# Patient Record
Sex: MALE | Race: WHITE | NOT HISPANIC OR LATINO
[De-identification: names, ages, dates, MRNs, and addresses within clinical notes are randomized per-mention and may not be internally consistent; named-entity substitution may affect disease eponyms.]

---

## 2017-01-31 ENCOUNTER — FORM ENCOUNTER (OUTPATIENT)
Age: 82
End: 2017-01-31

## 2017-02-01 ENCOUNTER — OUTPATIENT (OUTPATIENT)
Dept: OUTPATIENT SERVICES | Facility: HOSPITAL | Age: 82
LOS: 1 days | End: 2017-02-01
Payer: MEDICARE

## 2017-02-01 ENCOUNTER — APPOINTMENT (OUTPATIENT)
Dept: ORTHOPEDIC SURGERY | Facility: CLINIC | Age: 82
End: 2017-02-01

## 2017-02-01 VITALS
DIASTOLIC BLOOD PRESSURE: 69 MMHG | HEIGHT: 68.5 IN | BODY MASS INDEX: 24.57 KG/M2 | WEIGHT: 164 LBS | SYSTOLIC BLOOD PRESSURE: 129 MMHG

## 2017-02-01 DIAGNOSIS — Z98.89 OTHER SPECIFIED POSTPROCEDURAL STATES: Chronic | ICD-10-CM

## 2017-02-01 PROCEDURE — 73521 X-RAY EXAM HIPS BI 2 VIEWS: CPT

## 2017-02-01 PROCEDURE — 73523 X-RAY EXAM HIPS BI 5/> VIEWS: CPT | Mod: 26

## 2017-02-23 ENCOUNTER — FORM ENCOUNTER (OUTPATIENT)
Age: 82
End: 2017-02-23

## 2017-02-24 ENCOUNTER — OUTPATIENT (OUTPATIENT)
Dept: OUTPATIENT SERVICES | Facility: HOSPITAL | Age: 82
LOS: 1 days | End: 2017-02-24
Payer: MEDICARE

## 2017-02-24 ENCOUNTER — APPOINTMENT (OUTPATIENT)
Dept: ORTHOPEDIC SURGERY | Facility: CLINIC | Age: 82
End: 2017-02-24

## 2017-02-24 DIAGNOSIS — Z98.89 OTHER SPECIFIED POSTPROCEDURAL STATES: Chronic | ICD-10-CM

## 2017-02-24 PROCEDURE — 71020: CPT | Mod: 26

## 2017-02-24 PROCEDURE — 71046 X-RAY EXAM CHEST 2 VIEWS: CPT

## 2017-02-24 RX ORDER — CELECOXIB 100 MG/1
100 CAPSULE ORAL DAILY
Qty: 30 | Refills: 1 | Status: DISCONTINUED | COMMUNITY
Start: 2017-02-01 | End: 2017-02-24

## 2017-03-15 ENCOUNTER — OUTPATIENT (OUTPATIENT)
Dept: OUTPATIENT SERVICES | Facility: HOSPITAL | Age: 82
LOS: 1 days | End: 2017-03-15

## 2017-03-15 ENCOUNTER — OUTPATIENT (OUTPATIENT)
Dept: OUTPATIENT SERVICES | Facility: HOSPITAL | Age: 82
LOS: 1 days | End: 2017-03-15
Payer: MEDICARE

## 2017-03-15 DIAGNOSIS — Z98.89 OTHER SPECIFIED POSTPROCEDURAL STATES: Chronic | ICD-10-CM

## 2017-03-15 DIAGNOSIS — M16.11 UNILATERAL PRIMARY OSTEOARTHRITIS, RIGHT HIP: ICD-10-CM

## 2017-03-15 DIAGNOSIS — Z22.321 CARRIER OR SUSPECTED CARRIER OF METHICILLIN SUSCEPTIBLE STAPHYLOCOCCUS AUREUS: ICD-10-CM

## 2017-03-15 LAB
ANION GAP SERPL CALC-SCNC: 8 MMOL/L — LOW (ref 9–16)
APPEARANCE UR: CLEAR — SIGNIFICANT CHANGE UP
APTT BLD: 41.5 SEC — HIGH (ref 27.5–37.4)
BACTERIA # UR AUTO: PRESENT /HPF
BILIRUB UR-MCNC: NEGATIVE — SIGNIFICANT CHANGE UP
BUN SERPL-MCNC: 30 MG/DL — HIGH (ref 7–23)
CALCIUM SERPL-MCNC: 9.7 MG/DL — SIGNIFICANT CHANGE UP (ref 8.5–10.5)
CHLORIDE SERPL-SCNC: 106 MMOL/L — SIGNIFICANT CHANGE UP (ref 96–108)
CO2 SERPL-SCNC: 29 MMOL/L — SIGNIFICANT CHANGE UP (ref 22–31)
COLOR SPEC: YELLOW — SIGNIFICANT CHANGE UP
CREAT SERPL-MCNC: 1.04 MG/DL — SIGNIFICANT CHANGE UP (ref 0.5–1.3)
DIFF PNL FLD: (no result)
EPI CELLS # UR: SIGNIFICANT CHANGE UP /HPF
GLUCOSE SERPL-MCNC: 86 MG/DL — SIGNIFICANT CHANGE UP (ref 70–99)
GLUCOSE UR QL: NEGATIVE — SIGNIFICANT CHANGE UP
HBA1C BLD-MCNC: 3.9 % — LOW (ref 4.8–5.6)
HCT VFR BLD CALC: 43.6 % — SIGNIFICANT CHANGE UP (ref 39–50)
HGB BLD-MCNC: 16.3 G/DL — SIGNIFICANT CHANGE UP (ref 13–17)
INR BLD: 1.27 — HIGH (ref 0.88–1.16)
KETONES UR-MCNC: NEGATIVE — SIGNIFICANT CHANGE UP
LEUKOCYTE ESTERASE UR-ACNC: NEGATIVE — SIGNIFICANT CHANGE UP
MCHC RBC-ENTMCNC: 34.2 PG — HIGH (ref 27–34)
MCHC RBC-ENTMCNC: 37.4 G/DL — HIGH (ref 32–36)
MCV RBC AUTO: 91.4 FL — SIGNIFICANT CHANGE UP (ref 80–100)
NITRITE UR-MCNC: NEGATIVE — SIGNIFICANT CHANGE UP
PH UR: 5.5 — SIGNIFICANT CHANGE UP (ref 4–8)
PLATELET # BLD AUTO: 249 K/UL — SIGNIFICANT CHANGE UP (ref 150–400)
POTASSIUM SERPL-MCNC: 4.8 MMOL/L — SIGNIFICANT CHANGE UP (ref 3.5–5.3)
POTASSIUM SERPL-SCNC: 4.8 MMOL/L — SIGNIFICANT CHANGE UP (ref 3.5–5.3)
PROT UR-MCNC: NEGATIVE MG/DL — SIGNIFICANT CHANGE UP
PROTHROM AB SERPL-ACNC: 14.1 SEC — HIGH (ref 10–13.1)
RBC # BLD: 4.77 M/UL — SIGNIFICANT CHANGE UP (ref 4.2–5.8)
RBC # FLD: 13.1 % — SIGNIFICANT CHANGE UP (ref 10.3–16.9)
RBC CASTS # UR COMP ASSIST: < 5 /HPF — SIGNIFICANT CHANGE UP
SODIUM SERPL-SCNC: 143 MMOL/L — SIGNIFICANT CHANGE UP (ref 135–145)
SP GR SPEC: 1.02 — SIGNIFICANT CHANGE UP (ref 1–1.03)
UROBILINOGEN FLD QL: 0.2 E.U./DL — SIGNIFICANT CHANGE UP
WBC # BLD: 10.5 K/UL — SIGNIFICANT CHANGE UP (ref 3.8–10.5)
WBC # FLD AUTO: 10.5 K/UL — SIGNIFICANT CHANGE UP (ref 3.8–10.5)
WBC UR QL: < 5 /HPF — SIGNIFICANT CHANGE UP

## 2017-03-15 PROCEDURE — 85730 THROMBOPLASTIN TIME PARTIAL: CPT

## 2017-03-15 PROCEDURE — 81001 URINALYSIS AUTO W/SCOPE: CPT

## 2017-03-15 PROCEDURE — 85610 PROTHROMBIN TIME: CPT

## 2017-03-15 PROCEDURE — 83036 HEMOGLOBIN GLYCOSYLATED A1C: CPT

## 2017-03-15 PROCEDURE — 87086 URINE CULTURE/COLONY COUNT: CPT

## 2017-03-15 PROCEDURE — 80048 BASIC METABOLIC PNL TOTAL CA: CPT

## 2017-03-15 PROCEDURE — 93010 ELECTROCARDIOGRAM REPORT: CPT

## 2017-03-15 PROCEDURE — 81003 URINALYSIS AUTO W/O SCOPE: CPT

## 2017-03-15 PROCEDURE — 93005 ELECTROCARDIOGRAM TRACING: CPT

## 2017-03-15 PROCEDURE — 85027 COMPLETE CBC AUTOMATED: CPT

## 2017-03-16 LAB
CULTURE RESULTS: NO GROWTH — SIGNIFICANT CHANGE UP
SPECIMEN SOURCE: SIGNIFICANT CHANGE UP

## 2017-03-17 LAB
MRSA PCR RESULT.: NEGATIVE — SIGNIFICANT CHANGE UP
S AUREUS DNA NOSE QL NAA+PROBE: POSITIVE

## 2017-03-27 ENCOUNTER — APPOINTMENT (OUTPATIENT)
Dept: INTERNAL MEDICINE | Facility: CLINIC | Age: 82
End: 2017-03-27

## 2017-03-27 VITALS
DIASTOLIC BLOOD PRESSURE: 60 MMHG | SYSTOLIC BLOOD PRESSURE: 102 MMHG | RESPIRATION RATE: 16 BRPM | HEART RATE: 78 BPM | TEMPERATURE: 98 F | OXYGEN SATURATION: 97 %

## 2017-03-27 DIAGNOSIS — Z87.39 PERSONAL HISTORY OF OTHER DISEASES OF THE MUSCULOSKELETAL SYSTEM AND CONNECTIVE TISSUE: ICD-10-CM

## 2017-03-30 ENCOUNTER — MEDICATION RENEWAL (OUTPATIENT)
Age: 82
End: 2017-03-30

## 2017-03-30 DIAGNOSIS — M25.551 PAIN IN RIGHT HIP: ICD-10-CM

## 2017-03-30 DIAGNOSIS — G89.29 PAIN IN RIGHT HIP: ICD-10-CM

## 2017-04-03 ENCOUNTER — RESULT REVIEW (OUTPATIENT)
Age: 82
End: 2017-04-03

## 2017-04-03 VITALS
TEMPERATURE: 99 F | HEART RATE: 68 BPM | WEIGHT: 163.36 LBS | DIASTOLIC BLOOD PRESSURE: 62 MMHG | RESPIRATION RATE: 17 BRPM | OXYGEN SATURATION: 96 % | SYSTOLIC BLOOD PRESSURE: 120 MMHG | HEIGHT: 68 IN

## 2017-04-03 NOTE — PATIENT PROFILE ADULT. - PMH
Arthritis    Atrial fibrillation    Blood in urine  Pt went to urologist last week and MD said pt had blood in urine; not visible to pt  BPH (benign prostatic hypertrophy)    Constipation    Spinal stenosis  herniated disc

## 2017-04-03 NOTE — PATIENT PROFILE ADULT. - PSH
H/O hernia repair  X 3 - last procedure was 10 years ago H/O hernia repair  X 3 - last procedure was 10 years ago  History of hip replacement, total, left    History of tonsillectomy

## 2017-04-04 ENCOUNTER — INPATIENT (INPATIENT)
Facility: HOSPITAL | Age: 82
LOS: 2 days | Discharge: HOME HEALTH | DRG: 470 | End: 2017-04-07
Attending: ORTHOPAEDIC SURGERY | Admitting: ORTHOPAEDIC SURGERY
Payer: MEDICARE

## 2017-04-04 ENCOUNTER — APPOINTMENT (OUTPATIENT)
Dept: ORTHOPEDIC SURGERY | Facility: HOSPITAL | Age: 82
End: 2017-04-04

## 2017-04-04 DIAGNOSIS — Z90.89 ACQUIRED ABSENCE OF OTHER ORGANS: Chronic | ICD-10-CM

## 2017-04-04 DIAGNOSIS — Z96.642 PRESENCE OF LEFT ARTIFICIAL HIP JOINT: Chronic | ICD-10-CM

## 2017-04-04 DIAGNOSIS — Z98.89 OTHER SPECIFIED POSTPROCEDURAL STATES: Chronic | ICD-10-CM

## 2017-04-04 DIAGNOSIS — M16.11 UNILATERAL PRIMARY OSTEOARTHRITIS, RIGHT HIP: ICD-10-CM

## 2017-04-04 LAB
APTT BLD: 34.8 SEC — SIGNIFICANT CHANGE UP (ref 27.5–37.4)
INR BLD: 0.98 — SIGNIFICANT CHANGE UP (ref 0.88–1.16)
PROTHROM AB SERPL-ACNC: 10.9 SEC — SIGNIFICANT CHANGE UP (ref 9.8–12.7)

## 2017-04-04 PROCEDURE — 72170 X-RAY EXAM OF PELVIS: CPT | Mod: 26

## 2017-04-04 PROCEDURE — 27130 TOTAL HIP ARTHROPLASTY: CPT | Mod: RT

## 2017-04-04 RX ORDER — CELECOXIB 200 MG/1
200 CAPSULE ORAL ONCE
Qty: 0 | Refills: 0 | Status: COMPLETED | OUTPATIENT
Start: 2017-04-04 | End: 2017-04-04

## 2017-04-04 RX ORDER — SODIUM CHLORIDE 9 MG/ML
1000 INJECTION, SOLUTION INTRAVENOUS
Qty: 0 | Refills: 0 | Status: DISCONTINUED | OUTPATIENT
Start: 2017-04-04 | End: 2017-04-07

## 2017-04-04 RX ORDER — PANTOPRAZOLE SODIUM 20 MG/1
40 TABLET, DELAYED RELEASE ORAL
Qty: 0 | Refills: 0 | Status: DISCONTINUED | OUTPATIENT
Start: 2017-04-04 | End: 2017-04-07

## 2017-04-04 RX ORDER — OXYCODONE HYDROCHLORIDE 5 MG/1
10 TABLET ORAL EVERY 4 HOURS
Qty: 0 | Refills: 0 | Status: DISCONTINUED | OUTPATIENT
Start: 2017-04-04 | End: 2017-04-07

## 2017-04-04 RX ORDER — MORPHINE SULFATE 50 MG/1
2 CAPSULE, EXTENDED RELEASE ORAL
Qty: 0 | Refills: 0 | Status: DISCONTINUED | OUTPATIENT
Start: 2017-04-04 | End: 2017-04-07

## 2017-04-04 RX ORDER — ONDANSETRON 8 MG/1
4 TABLET, FILM COATED ORAL EVERY 6 HOURS
Qty: 0 | Refills: 0 | Status: DISCONTINUED | OUTPATIENT
Start: 2017-04-04 | End: 2017-04-07

## 2017-04-04 RX ORDER — MAGNESIUM HYDROXIDE 400 MG/1
30 TABLET, CHEWABLE ORAL DAILY
Qty: 0 | Refills: 0 | Status: DISCONTINUED | OUTPATIENT
Start: 2017-04-04 | End: 2017-04-07

## 2017-04-04 RX ORDER — TAMSULOSIN HYDROCHLORIDE 0.4 MG/1
0.4 CAPSULE ORAL AT BEDTIME
Qty: 0 | Refills: 0 | Status: DISCONTINUED | OUTPATIENT
Start: 2017-04-04 | End: 2017-04-07

## 2017-04-04 RX ORDER — OXYCODONE HYDROCHLORIDE 5 MG/1
10 TABLET ORAL ONCE
Qty: 0 | Refills: 0 | Status: DISCONTINUED | OUTPATIENT
Start: 2017-04-04 | End: 2017-04-04

## 2017-04-04 RX ORDER — SENNA PLUS 8.6 MG/1
2 TABLET ORAL AT BEDTIME
Qty: 0 | Refills: 0 | Status: DISCONTINUED | OUTPATIENT
Start: 2017-04-04 | End: 2017-04-07

## 2017-04-04 RX ORDER — ENOXAPARIN SODIUM 100 MG/ML
30 INJECTION SUBCUTANEOUS EVERY 12 HOURS
Qty: 0 | Refills: 0 | Status: DISCONTINUED | OUTPATIENT
Start: 2017-04-04 | End: 2017-04-07

## 2017-04-04 RX ORDER — BUPIVACAINE 13.3 MG/ML
20 INJECTION, SUSPENSION, LIPOSOMAL INFILTRATION ONCE
Qty: 0 | Refills: 0 | Status: DISCONTINUED | OUTPATIENT
Start: 2017-04-04 | End: 2017-04-07

## 2017-04-04 RX ORDER — FINASTERIDE 5 MG/1
5 TABLET, FILM COATED ORAL DAILY
Qty: 0 | Refills: 0 | Status: DISCONTINUED | OUTPATIENT
Start: 2017-04-04 | End: 2017-04-07

## 2017-04-04 RX ORDER — METOPROLOL TARTRATE 50 MG
12.5 TABLET ORAL DAILY
Qty: 0 | Refills: 0 | Status: DISCONTINUED | OUTPATIENT
Start: 2017-04-04 | End: 2017-04-07

## 2017-04-04 RX ORDER — ACETAMINOPHEN 500 MG
650 TABLET ORAL EVERY 6 HOURS
Qty: 0 | Refills: 0 | Status: DISCONTINUED | OUTPATIENT
Start: 2017-04-04 | End: 2017-04-07

## 2017-04-04 RX ORDER — CELECOXIB 200 MG/1
100 CAPSULE ORAL
Qty: 0 | Refills: 0 | Status: DISCONTINUED | OUTPATIENT
Start: 2017-04-04 | End: 2017-04-07

## 2017-04-04 RX ORDER — POLYETHYLENE GLYCOL 3350 17 G/17G
17 POWDER, FOR SOLUTION ORAL DAILY
Qty: 0 | Refills: 0 | Status: DISCONTINUED | OUTPATIENT
Start: 2017-04-04 | End: 2017-04-07

## 2017-04-04 RX ORDER — RIVAROXABAN 15 MG-20MG
1 KIT ORAL
Qty: 0 | Refills: 0 | COMMUNITY

## 2017-04-04 RX ORDER — DOCUSATE SODIUM 100 MG
100 CAPSULE ORAL THREE TIMES A DAY
Qty: 0 | Refills: 0 | Status: DISCONTINUED | OUTPATIENT
Start: 2017-04-04 | End: 2017-04-07

## 2017-04-04 RX ORDER — CEFAZOLIN SODIUM 1 G
2000 VIAL (EA) INJECTION EVERY 8 HOURS
Qty: 0 | Refills: 0 | Status: COMPLETED | OUTPATIENT
Start: 2017-04-04 | End: 2017-04-05

## 2017-04-04 RX ORDER — OXYCODONE HYDROCHLORIDE 5 MG/1
5 TABLET ORAL EVERY 4 HOURS
Qty: 0 | Refills: 0 | Status: DISCONTINUED | OUTPATIENT
Start: 2017-04-04 | End: 2017-04-07

## 2017-04-04 RX ADMIN — OXYCODONE HYDROCHLORIDE 10 MILLIGRAM(S): 5 TABLET ORAL at 10:01

## 2017-04-04 RX ADMIN — Medication 12.5 MILLIGRAM(S): at 22:33

## 2017-04-04 RX ADMIN — Medication 100 MILLIGRAM(S): at 22:33

## 2017-04-04 RX ADMIN — SODIUM CHLORIDE 100 MILLILITER(S): 9 INJECTION, SOLUTION INTRAVENOUS at 22:33

## 2017-04-04 RX ADMIN — FINASTERIDE 5 MILLIGRAM(S): 5 TABLET, FILM COATED ORAL at 22:34

## 2017-04-04 RX ADMIN — TAMSULOSIN HYDROCHLORIDE 0.4 MILLIGRAM(S): 0.4 CAPSULE ORAL at 22:34

## 2017-04-04 RX ADMIN — SENNA PLUS 2 TABLET(S): 8.6 TABLET ORAL at 22:34

## 2017-04-04 RX ADMIN — CELECOXIB 200 MILLIGRAM(S): 200 CAPSULE ORAL at 10:01

## 2017-04-04 NOTE — H&P PST ADULT - LAB RESULTS AND INTERPRETATION
Preop CBC, BMP, UA - WNL per medical clearance.  PT/PTT/INR (mildly elevated on xarelto pre-op) Will repeat on DOS.  Preop Nasal swab positive for MSSA - treated x 5days with mupirocin BID.

## 2017-04-04 NOTE — H&P PST ADULT - NOTES
Pt daughter and grandson are willing to come live with pt to provide assistance post-op so he can go home. Pt does not want to go to Rehab.

## 2017-04-04 NOTE — H&P PST ADULT - PSH
H/O hernia repair  X 3 - last procedure was 10 years ago  History of hip replacement, total, left    History of tonsillectomy

## 2017-04-04 NOTE — PHYSICAL THERAPY INITIAL EVALUATION ADULT - IMPAIRMENTS CONTRIBUTING TO GAIT DEVIATIONS, PT EVAL
impaired motor control/impaired postural control/impaired sensory feedback/decreased strength/decreased ROM/impaired balance/pain

## 2017-04-04 NOTE — H&P PST ADULT - HISTORY OF PRESENT ILLNESS
82 yo M presents c/o right hip pain which became severe last summer causing pt to experience great difficulty when walking. Pt endorses that his hip "gives way" and his pain is even occasionally severe enough to wake him from sleep recently. Pt radiates to his ankle but is not associated with numbness or tingling. Pt pain began spontaneously and without accident or trauma. Pt pain has progressively worsened without improvement and has become increasingly unresponsive to conservative management. Pt ambulates without assistance at home usually though he admits to using a cane occasionally when pain is especially severe. Denies numbness/tingling of extremities, F/C/N/V, CP, SOB today.     Of note, pt takes xarelto for AFIB and his last dose was 3 days ago.    Presents today for elective Right Total Hip Arthroplasty.

## 2017-04-04 NOTE — PHYSICAL THERAPY INITIAL EVALUATION ADULT - BALANCE DISTURBANCE, IDENTIFIED IMPAIRMENT CONTRIBUTE, REHAB EVAL
decreased ROM/impaired motor control/impaired sensory feedback/pain/impaired postural control/decreased strength

## 2017-04-04 NOTE — PHYSICAL THERAPY INITIAL EVALUATION ADULT - PLANNED THERAPY INTERVENTIONS, PT EVAL
strengthening/neuromuscular re-education/balance training/bed mobility training/transfer training/gait training

## 2017-04-04 NOTE — PROGRESS NOTE ADULT - SUBJECTIVE AND OBJECTIVE BOX
Orthopaedics Post Op Check    Procedure: Right total hip replacement  Surgeon: Dr. Dejesus    Pt comfortable, without complaints  Denies CP, SOB, N/V, numbness/tingling     Vital Signs Last 24 Hrs  T(C): 36.1, Max: 36.1 (04-04 @ 15:00)  T(F): 97, Max: 97 (04-04 @ 15:00)  HR: 72 (72 - 77)  BP: 101/57 (87/58 - 101/57)  BP(mean): --  RR: 16 (16 - 16)  SpO2: 97% (97% - 98%)  AVSS, NAD    Dressing C/D/I  General: Pt Alert and oriented     Pulses: DP pulses 2+ bilaterally  Sensation: SLT in tact and equal to distal bilateral lower extremities  Motor: EHL/FHL/TA/GS 5/5 motor strength bilateral lower extremities    Post op XR: Right hip prosthesis in place    A/P: 81yMale POD#0 s/p right THR  - Stable  - Pain Control  - DVT ppx: Lovenox 30 BID  - Post op abx: Ancef  - PT, WBS: WBAT  - F/U AM Labs

## 2017-04-04 NOTE — H&P PST ADULT - PROBLEM SELECTOR PLAN 1
Admit to Orthopaedic Service.  Presents today for elective Right Total Hip Arthroplasty.  Pt medically stable and cleared for procedure today by Dr. Langston, Cardiac Clearance by Dr. Spencer.

## 2017-04-04 NOTE — H&P PST ADULT - FAMILY HISTORY
Father  Still living? Unknown  Family history of cardiac disorder in father, Age at diagnosis: Age Unknown     Sibling  Still living? Unknown  Family history of cardiac disorder in father, Age at diagnosis: Age Unknown

## 2017-04-04 NOTE — PHYSICAL THERAPY INITIAL EVALUATION ADULT - ADDITIONAL COMMENTS
No prior use of assistive device, however owns a cane. One large 12" step to approach outside steps of house. 5 steps to enter and 12 steps to get upstairs to bedroom.

## 2017-04-04 NOTE — PHYSICAL THERAPY INITIAL EVALUATION ADULT - IMPAIRED TRANSFERS: SIT/STAND, REHAB EVAL
impaired sensory feedback/impaired postural control/decreased ROM/impaired motor control/pain/impaired balance/decreased strength

## 2017-04-05 ENCOUNTER — TRANSCRIPTION ENCOUNTER (OUTPATIENT)
Age: 82
End: 2017-04-05

## 2017-04-05 LAB
ANION GAP SERPL CALC-SCNC: 9 MMOL/L — SIGNIFICANT CHANGE UP (ref 9–16)
BUN SERPL-MCNC: 30 MG/DL — HIGH (ref 7–23)
CALCIUM SERPL-MCNC: 8.7 MG/DL — SIGNIFICANT CHANGE UP (ref 8.5–10.5)
CHLORIDE SERPL-SCNC: 108 MMOL/L — SIGNIFICANT CHANGE UP (ref 96–108)
CO2 SERPL-SCNC: 24 MMOL/L — SIGNIFICANT CHANGE UP (ref 22–31)
CREAT SERPL-MCNC: 0.98 MG/DL — SIGNIFICANT CHANGE UP (ref 0.5–1.3)
GLUCOSE SERPL-MCNC: 107 MG/DL — HIGH (ref 70–99)
HCT VFR BLD CALC: 33.6 % — LOW (ref 39–50)
HGB BLD-MCNC: 12.7 G/DL — LOW (ref 13–17)
MCHC RBC-ENTMCNC: 34.2 PG — HIGH (ref 27–34)
MCHC RBC-ENTMCNC: 37.8 G/DL — HIGH (ref 32–36)
MCV RBC AUTO: 90.6 FL — SIGNIFICANT CHANGE UP (ref 80–100)
PLATELET # BLD AUTO: 189 K/UL — SIGNIFICANT CHANGE UP (ref 150–400)
POTASSIUM SERPL-MCNC: 4.4 MMOL/L — SIGNIFICANT CHANGE UP (ref 3.5–5.3)
POTASSIUM SERPL-SCNC: 4.4 MMOL/L — SIGNIFICANT CHANGE UP (ref 3.5–5.3)
RBC # BLD: 3.71 M/UL — LOW (ref 4.2–5.8)
RBC # FLD: 12.4 % — SIGNIFICANT CHANGE UP (ref 10.3–16.9)
SODIUM SERPL-SCNC: 141 MMOL/L — SIGNIFICANT CHANGE UP (ref 135–145)
WBC # BLD: 17.6 K/UL — HIGH (ref 3.8–10.5)
WBC # FLD AUTO: 17.6 K/UL — HIGH (ref 3.8–10.5)

## 2017-04-05 PROCEDURE — 99221 1ST HOSP IP/OBS SF/LOW 40: CPT

## 2017-04-05 RX ADMIN — CELECOXIB 100 MILLIGRAM(S): 200 CAPSULE ORAL at 17:41

## 2017-04-05 RX ADMIN — PANTOPRAZOLE SODIUM 40 MILLIGRAM(S): 20 TABLET, DELAYED RELEASE ORAL at 05:10

## 2017-04-05 RX ADMIN — FINASTERIDE 5 MILLIGRAM(S): 5 TABLET, FILM COATED ORAL at 12:32

## 2017-04-05 RX ADMIN — SODIUM CHLORIDE 100 MILLILITER(S): 9 INJECTION, SOLUTION INTRAVENOUS at 06:55

## 2017-04-05 RX ADMIN — OXYCODONE HYDROCHLORIDE 5 MILLIGRAM(S): 5 TABLET ORAL at 14:39

## 2017-04-05 RX ADMIN — POLYETHYLENE GLYCOL 3350 17 GRAM(S): 17 POWDER, FOR SOLUTION ORAL at 12:13

## 2017-04-05 RX ADMIN — Medication 100 MILLIGRAM(S): at 13:47

## 2017-04-05 RX ADMIN — Medication 100 MILLIGRAM(S): at 21:16

## 2017-04-05 RX ADMIN — SENNA PLUS 2 TABLET(S): 8.6 TABLET ORAL at 21:16

## 2017-04-05 RX ADMIN — CELECOXIB 100 MILLIGRAM(S): 200 CAPSULE ORAL at 09:39

## 2017-04-05 RX ADMIN — OXYCODONE HYDROCHLORIDE 5 MILLIGRAM(S): 5 TABLET ORAL at 23:30

## 2017-04-05 RX ADMIN — OXYCODONE HYDROCHLORIDE 5 MILLIGRAM(S): 5 TABLET ORAL at 13:46

## 2017-04-05 RX ADMIN — TAMSULOSIN HYDROCHLORIDE 0.4 MILLIGRAM(S): 0.4 CAPSULE ORAL at 21:16

## 2017-04-05 RX ADMIN — Medication 100 MILLIGRAM(S): at 05:10

## 2017-04-05 RX ADMIN — CELECOXIB 100 MILLIGRAM(S): 200 CAPSULE ORAL at 08:59

## 2017-04-05 RX ADMIN — CELECOXIB 100 MILLIGRAM(S): 200 CAPSULE ORAL at 18:56

## 2017-04-05 RX ADMIN — OXYCODONE HYDROCHLORIDE 5 MILLIGRAM(S): 5 TABLET ORAL at 22:45

## 2017-04-05 RX ADMIN — ENOXAPARIN SODIUM 30 MILLIGRAM(S): 100 INJECTION SUBCUTANEOUS at 17:40

## 2017-04-05 RX ADMIN — ENOXAPARIN SODIUM 30 MILLIGRAM(S): 100 INJECTION SUBCUTANEOUS at 05:10

## 2017-04-05 RX ADMIN — Medication 12.5 MILLIGRAM(S): at 22:37

## 2017-04-05 NOTE — PROGRESS NOTE ADULT - SUBJECTIVE AND OBJECTIVE BOX
ORTHO NOTE    [x ] Pt seen/examined. 8am  [ ] Pt without any complaints/in NAD.    [x ] Pt complains of: Incisional pain with movements     [ ] ROS  otherwise negative    .    PHYSICAL EXAM:    Vital Signs Last 24 Hrs  T(C): 36.8, Max: 37.2 (04-04 @ 17:00)  T(F): 98.2, Max: 99 (04-04 @ 17:00)  HR: 68 (62 - 79)  BP: 109/61 (87/58 - 142/69)  BP(mean): --  RR: 16 (15 - 18)  SpO2: 98% (96% - 98%)    I&O's Detail  I & Os for 24h ending 05 Apr 2017 07:00  =============================================  IN:    lactated ringers.: 1200 ml    IV PiggyBack: 100 ml    Total IN: 1300 ml  ---------------------------------------------  OUT:    Voided: 1560 ml    Total OUT: 1560 ml  ---------------------------------------------  Total NET: -260 ml    I & Os for current day (as of 05 Apr 2017 12:24)  =============================================  IN:    Total IN: 0 ml  ---------------------------------------------  OUT:    Voided: 130 ml    Total OUT: 130 ml  ---------------------------------------------  Total NET: -130 ml       CAPILLARY BLOOD GLUCOSE                  Neuro: A&0x3    Lungs: Denies SOB    CV: Denies chest pain    ABD: NON distended positive bowel sounds      Ext: NVID Dressing clean dry and intact.      LABS                        12.7   17.6  )-----------( 189      ( 05 Apr 2017 08:15 )             33.6                              PT/INR - ( 04 Apr 2017 09:25 )   PT: 10.9 sec;   INR: 0.98          PTT - ( 04 Apr 2017 09:25 )  PTT:34.8 sec  04-05    141  |  108  |  30<H>  ----------------------------<  107<H>  4.4   |  24  |  0.98    Ca    8.7      05 Apr 2017 08:15           ASSESSMENT/PLAN:      STATUS POST:  Right MARSHA    CONTINUE:          [x ] PT WBAT    [x ] DVT PPX-Lovenox hx Afib    [x ] Pain MgtMEDICATIONS  (PRN):  oxyCODONE IR 5milliGRAM(s) Oral every 4 hours PRN Mild Pain  oxyCODONE IR 10milliGRAM(s) Oral every 4 hours PRN Moderate Pain  morphine  - Injectable 2milliGRAM(s) IV Push every 3 hours PRN Severe Pain  morphine  - Injectable 2milliGRAM(s) IV Push every 15 minutes PRN Severe Pain (7 - 10)      [x ] Dispo plan- Home

## 2017-04-05 NOTE — DISCHARGE NOTE ADULT - CARE PLAN
Principal Discharge DX:	Arthritis  Goal:	Pain Management, improve mobility  Instructions for follow-up, activity and diet:	see below

## 2017-04-05 NOTE — DISCHARGE NOTE ADULT - HOSPITAL COURSE
Admit 4/4/17  Surgery S/P Right MARSHA (posterior)  Pre/post-op Antibiotics  DVT prophylaxis  Physical Therapy  Pain Management

## 2017-04-05 NOTE — DISCHARGE NOTE ADULT - CARE PROVIDER_API CALL
Charly Dejesus), Orthopaedic Surgery  [address] [place] [address]  Phone: [phone]  Fax: [phone]

## 2017-04-05 NOTE — DISCHARGE NOTE ADULT - MEDICATION SUMMARY - MEDICATIONS TO TAKE
I will START or STAY ON the medications listed below when I get home from the hospital:    finasteride 5 mg oral tablet  -- 1 tab(s) by mouth once a day  -- Indication: For Home meds    magnesium hydroxide 8% oral suspension  -- 30 milliliter(s) by mouth once a day, As needed, Constipation  -- Indication: For Prevent constipation    tamsulosin 0.4 mg oral capsule  -- 1 cap(s) by mouth once a day  -- Indication: For Home meds    Xarelto 20 mg oral tablet  -- 1 tab(s) by mouth once a day (in the evening)  -- Indication: For Home meds    metoprolol  -- 12.5 milligram(s) by mouth once a day  -- Indication: For Home meds    docusate sodium 100 mg oral capsule  -- 1 cap(s) by mouth 3 times a day  -- Indication: For stool softener    omeprazole 40 mg oral delayed release capsule  -- 1 cap(s) by mouth once a day  -- Indication: For Home meds

## 2017-04-05 NOTE — CONSULT NOTE ADULT - SUBJECTIVE AND OBJECTIVE BOX
Patient seen and examined, Chart reviewed    HPI:  80 yo M presents c/o right hip pain which became severe last summer causing pt to experience great difficulty when walking. Pt endorses that his hip "gives way" and his pain is even occasionally severe enough to wake him from sleep recently. Pt radiates to his ankle but is not associated with numbness or tingling. Pt pain began spontaneously and without accident or trauma. Pt pain has progressively worsened without improvement and has become increasingly unresponsive to conservative management. Pt ambulates without assistance at home usually though he admits to using a cane occasionally when pain is especially severe. Denies numbness/tingling of extremities, F/C/N/V, CP, SOB today.     Of note, pt takes xarelto for AFIB and his last dose was 3 days ago.    Presents today for elective Right Total Hip Arthroplasty. (04 Apr 2017 13:57)      PAST MEDICAL & SURGICAL HISTORY:  BPH (benign prostatic hypertrophy)  Spinal stenosis: herniated disc  Arthritis  Blood in urine: Pt went to urologist last week and MD said pt had blood in urine; not visible to pt  Constipation  Atrial fibrillation  History of tonsillectomy  History of hip replacement, total, left  H/O hernia repair: X 3 - last procedure was 10 years ago      REVIEW OF SYSTEMS:  CONSTITUTIONAL:  No night sweats.  Some fatigue, pos insomnia,  No fever or chills.  HEENT:  Eyes:  No visual changes.    RESPIRATORY:  No cough.  No wheeze.    No shortness of breath.  CARDIOVASCULAR:  No chest pains.  No palpitations.  GASTROINTESTINAL:  No abdominal pain.  No nausea or vomiting.  No diarrhea or constipation.    GENITOURINARY:  No urgency.  No frequency.  No dysuria.   MUSCULOSKELETAL:  No musculoskeletal pain.      SKIN:  No rashes.  No lesions.  No wounds.      lactated ringers. 1000milliLiter(s) IV Continuous <Continuous>  enoxaparin Injectable 30milliGRAM(s) SubCutaneous every 12 hours  acetaminophen   Tablet 650milliGRAM(s) Oral every 6 hours PRN  celecoxib 100milliGRAM(s) Oral two times a day after meals  oxyCODONE IR 5milliGRAM(s) Oral every 4 hours PRN  oxyCODONE IR 10milliGRAM(s) Oral every 4 hours PRN  BUpivacaine liposome 1.3% Injectable (no eMAR) 20milliLiter(s) Local Injection once  morphine  - Injectable 2milliGRAM(s) IV Push every 3 hours PRN  aluminum hydroxide/magnesium hydroxide/simethicone Suspension 30milliLiter(s) Oral four times a day PRN  ondansetron Injectable 4milliGRAM(s) IV Push every 6 hours PRN  polyethylene glycol 3350 17Gram(s) Oral daily  senna 2Tablet(s) Oral at bedtime  magnesium hydroxide Suspension 30milliLiter(s) Oral daily PRN  docusate sodium 100milliGRAM(s) Oral three times a day  morphine  - Injectable 2milliGRAM(s) IV Push every 15 minutes PRN  finasteride 5milliGRAM(s) Oral daily  tamsulosin 0.4milliGRAM(s) Oral at bedtime  metoprolol 12.5milliGRAM(s) Oral daily  pantoprazole    Tablet 40milliGRAM(s) Oral before breakfast      Allergies    No Known Allergies      SOCIAL HISTORY: no tob    FAMILY HISTORY:  Family history of cardiac disorder in father (Father, Sibling)      Vital Signs Last 24 Hrs  T(C): 36.8, Max: 37.2 (04-04 @ 17:00)  T(F): 98.2, Max: 99 (04-04 @ 17:00)  HR: 62 (62 - 79)  BP: 99/58 (87/58 - 142/69)  BP(mean): --  RR: 18 (15 - 18)  SpO2: 96% (96% - 98%)    I & Os for current day (as of 04-05 @ 06:30)  =============================================  IN: 1300 ml / OUT: 1330 ml / NET: -30 ml      PHYSICAL EXAM:   General - NAD, Alert and oriented x 3,   Neck - - No lymphadenopathy  Cardiovascular - RRR  2/6 sam /r/g, no JVD  Lungs - Clear to auscltation, no use of acessory muscles, no crackles or wheezes.  Skin - No rashes, skin warm and dry, no erythematous areas  Abdomen - Normal bowel sounds, abdomen soft and nontender  Extremeties - No edema, SCD's opn b/l le  Neurological – no focal deficits      LABS        PT/INR - ( 04 Apr 2017 09:25 )   PT: 10.9 sec;   INR: 0.98          PTT - ( 04 Apr 2017 09:25 )  PTT:34.8 sec      RADIOLOGY & ADDITIONAL STUDIES:

## 2017-04-05 NOTE — DISCHARGE NOTE ADULT - MEDICATION SUMMARY - MEDICATIONS TO STOP TAKING
I will STOP taking the medications listed below when I get home from the hospital:    mupirocin topical 2% topical ointment  -- Apply on skin to both nostrils 2 times a day for 5 days  -- For external use only.

## 2017-04-05 NOTE — DISCHARGE NOTE ADULT - PATIENT PORTAL LINK FT
“You can access the Follow[employer] Patient Portal, offered by [facility], by registering with the following website: [email or web address]”

## 2017-04-05 NOTE — DISCHARGE NOTE ADULT - ADDITIONAL INSTRUCTIONS
No strenuous activity, heavy lifting, driving, tub bathing, or returning to work until cleared by MD.  You may shower--dressing is waterproof.  Remove dressing after post op day 7, then leave incision open to air.  Follow up with Dr. Dejesus call  to schedule an appt within 10-14 days.  If you don't have a bowel movement by post op day 3, then take Milk of Magnesia (over the counter).  If no bowel movement by at least post op day 5, then use a Dulcolax suppository (over the counter) and/or a Fleets enema--if still no bowel movement, call your MD.  Contact your doctor if you experience: fever greater than 101.5, chills, chest pain, difficulty breathing, bleeding, redness or heat around the incision.     Please follow up with your primary care provider.  Please take  pain meds as prescribed by Dr Dejesus.

## 2017-04-05 NOTE — PROGRESS NOTE ADULT - SUBJECTIVE AND OBJECTIVE BOX
SUBJECTIVE: Patient seen and examined. Pt doing well o/n.  No f/c/n/v/cp/sob.     OBJECTIVE:  Vital Signs Last 24 Hrs  T(C): 36.8, Max: 37.2 (04-04 @ 17:00)  T(F): 98.2, Max: 99 (04-04 @ 17:00)  HR: 68 (62 - 79)  BP: 109/61 (87/58 - 142/69)  BP(mean): --  RR: 16 (15 - 18)  SpO2: 98% (96% - 98%)    Affected extremity:          Dressing: clean/dry/intact            Sensation: SILT         Motor exam: 5/5 TA/GS/EHL         warm well perfused; capillary refill <3 seconds     LABS:                        12.7   17.6  )-----------( 189      ( 05 Apr 2017 08:15 )             33.6     04-05    141  |  108  |  30<H>  ----------------------------<  107<H>  4.4   |  24  |  0.98    Ca    8.7      05 Apr 2017 08:15      PT/INR - ( 04 Apr 2017 09:25 )   PT: 10.9 sec;   INR: 0.98          PTT - ( 04 Apr 2017 09:25 )  PTT:34.8 sec    MEDICATIONS:lactated ringers. 1000milliLiter(s) IV Continuous <Continuous>  enoxaparin Injectable 30milliGRAM(s) SubCutaneous every 12 hours  acetaminophen   Tablet 650milliGRAM(s) Oral every 6 hours PRN  celecoxib 100milliGRAM(s) Oral two times a day after meals  oxyCODONE IR 5milliGRAM(s) Oral every 4 hours PRN  oxyCODONE IR 10milliGRAM(s) Oral every 4 hours PRN  BUpivacaine liposome 1.3% Injectable (no eMAR) 20milliLiter(s) Local Injection once  morphine  - Injectable 2milliGRAM(s) IV Push every 3 hours PRN  aluminum hydroxide/magnesium hydroxide/simethicone Suspension 30milliLiter(s) Oral four times a day PRN  ondansetron Injectable 4milliGRAM(s) IV Push every 6 hours PRN  polyethylene glycol 3350 17Gram(s) Oral daily  senna 2Tablet(s) Oral at bedtime  magnesium hydroxide Suspension 30milliLiter(s) Oral daily PRN  docusate sodium 100milliGRAM(s) Oral three times a day  morphine  - Injectable 2milliGRAM(s) IV Push every 15 minutes PRN  finasteride 5milliGRAM(s) Oral daily  tamsulosin 0.4milliGRAM(s) Oral at bedtime  metoprolol 12.5milliGRAM(s) Oral daily  pantoprazole    Tablet 40milliGRAM(s) Oral before breakfast    Anticoagulation:  enoxaparin Injectable 30milliGRAM(s) SubCutaneous every 12 hours    Antibiotics:     Pain medications:   acetaminophen   Tablet 650milliGRAM(s) Oral every 6 hours PRN  celecoxib 100milliGRAM(s) Oral two times a day after meals  oxyCODONE IR 5milliGRAM(s) Oral every 4 hours PRN  oxyCODONE IR 10milliGRAM(s) Oral every 4 hours PRN  morphine  - Injectable 2milliGRAM(s) IV Push every 3 hours PRN  ondansetron Injectable 4milliGRAM(s) IV Push every 6 hours PRN  morphine  - Injectable 2milliGRAM(s) IV Push every 15 minutes PRN    A/P :  Pt is a 82yo Male s/p R MARSHA 4/4/17 POD # 1  -    Pain control  -    DVT ppx: Lovenox     -    Weight bearing status: WBAT   -    Physical Therapy  -    Dispo: Home

## 2017-04-05 NOTE — CONSULT NOTE ADULT - ASSESSMENT
80 yo M with BPH, Spinal stenosis, atrial fibrillation on xarelto with severe right hip pain now s/p elective right THR  1: A Fib - restart xarelto, continue metoprolol  2: BPH: continue tamsulosin and finasteride  3: Pain mgment / bowel regimen  4: DVT PPX: on lovenox - dc once xarelto restarted, continue SCDs while in bed

## 2017-04-06 LAB
ANION GAP SERPL CALC-SCNC: 10 MMOL/L — SIGNIFICANT CHANGE UP (ref 9–16)
BUN SERPL-MCNC: 24 MG/DL — HIGH (ref 7–23)
CALCIUM SERPL-MCNC: 8.9 MG/DL — SIGNIFICANT CHANGE UP (ref 8.5–10.5)
CHLORIDE SERPL-SCNC: 106 MMOL/L — SIGNIFICANT CHANGE UP (ref 96–108)
CO2 SERPL-SCNC: 23 MMOL/L — SIGNIFICANT CHANGE UP (ref 22–31)
CREAT SERPL-MCNC: 1.12 MG/DL — SIGNIFICANT CHANGE UP (ref 0.5–1.3)
GLUCOSE SERPL-MCNC: 118 MG/DL — HIGH (ref 70–99)
HCT VFR BLD CALC: 35.7 % — LOW (ref 39–50)
HGB BLD-MCNC: 13.3 G/DL — SIGNIFICANT CHANGE UP (ref 13–17)
MCHC RBC-ENTMCNC: 33.9 PG — SIGNIFICANT CHANGE UP (ref 27–34)
MCHC RBC-ENTMCNC: 37.3 G/DL — HIGH (ref 32–36)
MCV RBC AUTO: 91.1 FL — SIGNIFICANT CHANGE UP (ref 80–100)
PLATELET # BLD AUTO: 215 K/UL — SIGNIFICANT CHANGE UP (ref 150–400)
POTASSIUM SERPL-MCNC: 4.4 MMOL/L — SIGNIFICANT CHANGE UP (ref 3.5–5.3)
POTASSIUM SERPL-SCNC: 4.4 MMOL/L — SIGNIFICANT CHANGE UP (ref 3.5–5.3)
RBC # BLD: 3.92 M/UL — LOW (ref 4.2–5.8)
RBC # FLD: 13.1 % — SIGNIFICANT CHANGE UP (ref 10.3–16.9)
SODIUM SERPL-SCNC: 139 MMOL/L — SIGNIFICANT CHANGE UP (ref 135–145)
WBC # BLD: 15.8 K/UL — HIGH (ref 3.8–10.5)
WBC # FLD AUTO: 15.8 K/UL — HIGH (ref 3.8–10.5)

## 2017-04-06 PROCEDURE — 99232 SBSQ HOSP IP/OBS MODERATE 35: CPT

## 2017-04-06 RX ORDER — SCOPALAMINE 1 MG/3D
1.5 PATCH, EXTENDED RELEASE TRANSDERMAL ONCE
Qty: 0 | Refills: 0 | Status: DISCONTINUED | OUTPATIENT
Start: 2017-04-06 | End: 2017-04-07

## 2017-04-06 RX ORDER — MAGNESIUM HYDROXIDE 400 MG/1
30 TABLET, CHEWABLE ORAL
Qty: 0 | Refills: 0 | DISCHARGE
Start: 2017-04-06

## 2017-04-06 RX ORDER — DOCUSATE SODIUM 100 MG
1 CAPSULE ORAL
Qty: 0 | Refills: 0 | COMMUNITY
Start: 2017-04-06

## 2017-04-06 RX ADMIN — ONDANSETRON 4 MILLIGRAM(S): 8 TABLET, FILM COATED ORAL at 08:54

## 2017-04-06 RX ADMIN — Medication 100 MILLIGRAM(S): at 06:26

## 2017-04-06 RX ADMIN — MAGNESIUM HYDROXIDE 30 MILLILITER(S): 400 TABLET, CHEWABLE ORAL at 09:09

## 2017-04-06 RX ADMIN — OXYCODONE HYDROCHLORIDE 5 MILLIGRAM(S): 5 TABLET ORAL at 13:58

## 2017-04-06 RX ADMIN — ENOXAPARIN SODIUM 30 MILLIGRAM(S): 100 INJECTION SUBCUTANEOUS at 18:29

## 2017-04-06 RX ADMIN — TAMSULOSIN HYDROCHLORIDE 0.4 MILLIGRAM(S): 0.4 CAPSULE ORAL at 21:32

## 2017-04-06 RX ADMIN — Medication 100 MILLIGRAM(S): at 13:58

## 2017-04-06 RX ADMIN — FINASTERIDE 5 MILLIGRAM(S): 5 TABLET, FILM COATED ORAL at 12:04

## 2017-04-06 RX ADMIN — POLYETHYLENE GLYCOL 3350 17 GRAM(S): 17 POWDER, FOR SOLUTION ORAL at 12:04

## 2017-04-06 RX ADMIN — ENOXAPARIN SODIUM 30 MILLIGRAM(S): 100 INJECTION SUBCUTANEOUS at 06:26

## 2017-04-06 RX ADMIN — SENNA PLUS 2 TABLET(S): 8.6 TABLET ORAL at 21:32

## 2017-04-06 RX ADMIN — OXYCODONE HYDROCHLORIDE 5 MILLIGRAM(S): 5 TABLET ORAL at 09:40

## 2017-04-06 RX ADMIN — Medication 12.5 MILLIGRAM(S): at 21:32

## 2017-04-06 RX ADMIN — Medication 100 MILLIGRAM(S): at 21:32

## 2017-04-06 RX ADMIN — PANTOPRAZOLE SODIUM 40 MILLIGRAM(S): 20 TABLET, DELAYED RELEASE ORAL at 06:26

## 2017-04-06 RX ADMIN — CELECOXIB 100 MILLIGRAM(S): 200 CAPSULE ORAL at 09:49

## 2017-04-06 RX ADMIN — CELECOXIB 100 MILLIGRAM(S): 200 CAPSULE ORAL at 08:54

## 2017-04-06 RX ADMIN — OXYCODONE HYDROCHLORIDE 5 MILLIGRAM(S): 5 TABLET ORAL at 08:54

## 2017-04-06 RX ADMIN — CELECOXIB 100 MILLIGRAM(S): 200 CAPSULE ORAL at 18:29

## 2017-04-06 RX ADMIN — OXYCODONE HYDROCHLORIDE 5 MILLIGRAM(S): 5 TABLET ORAL at 14:31

## 2017-04-06 NOTE — PROGRESS NOTE ADULT - SUBJECTIVE AND OBJECTIVE BOX
INTERVAL HPI/OVERNIGHT EVENTS:  reports stiffness right leg    MEDICATIONS  (STANDING):  lactated ringers. 1000milliLiter(s) IV Continuous <Continuous>  enoxaparin Injectable 30milliGRAM(s) SubCutaneous every 12 hours  celecoxib 100milliGRAM(s) Oral two times a day after meals  BUpivacaine liposome 1.3% Injectable (no eMAR) 20milliLiter(s) Local Injection once  polyethylene glycol 3350 17Gram(s) Oral daily  senna 2Tablet(s) Oral at bedtime  docusate sodium 100milliGRAM(s) Oral three times a day  finasteride 5milliGRAM(s) Oral daily  tamsulosin 0.4milliGRAM(s) Oral at bedtime  metoprolol 12.5milliGRAM(s) Oral daily  pantoprazole    Tablet 40milliGRAM(s) Oral before breakfast    MEDICATIONS  (PRN):  acetaminophen   Tablet 650milliGRAM(s) Oral every 6 hours PRN For Temp over 38.3 C (100.94 F)  oxyCODONE IR 5milliGRAM(s) Oral every 4 hours PRN Mild Pain  oxyCODONE IR 10milliGRAM(s) Oral every 4 hours PRN Moderate Pain  morphine  - Injectable 2milliGRAM(s) IV Push every 3 hours PRN Severe Pain  aluminum hydroxide/magnesium hydroxide/simethicone Suspension 30milliLiter(s) Oral four times a day PRN Indigestion  ondansetron Injectable 4milliGRAM(s) IV Push every 6 hours PRN Nausea and/or Vomiting  bisacodyl Suppository 10milliGRAM(s) Rectal daily PRN If no bowel movement by POD#2  magnesium hydroxide Suspension 30milliLiter(s) Oral daily PRN Constipation  morphine  - Injectable 2milliGRAM(s) IV Push every 15 minutes PRN Severe Pain (7 - 10)      Allergies    No Known Allergies      REVIEW OF SYSTEMS:  CONSTITUTIONAL:  No night sweats.  Some fatigue, pos insomnia,  No fever or chills.  HEENT:  Eyes:  No visual changes.    RESPIRATORY:  No cough.  No wheeze.    No shortness of breath.  CARDIOVASCULAR:  No chest pains.  No palpitations.  GASTROINTESTINAL:  No abdominal pain.  No nausea or vomiting.  No diarrhea or constipation.    GENITOURINARY:  No urgency.  No frequency.  No dysuria.   MUSCULOSKELETAL:stifness and pain with movement     SKIN:  No rashes.  No lesions.  No wounds.      T(C): 37.4, Max: 37.7 (04-05 @ 20:34)  HR: 77 (68 - 77)  BP: 118/63 (92/51 - 120/57)  RR: 15 (15 - 17)  SpO2: 97% (96% - 100%)  Wt(kg): --    PHYSICAL EXAM: General - NAD, Alert and oriented x 3,   Neck - - No lymphadenopathy  Cardiovascular - RRR  3/6 sam /r/g, no JVD  Lungs - Clear to auscltation, no use of acessory muscles, no crackles or wheezes.  Skin - No rashes, skin warm and dry, no erythematous areas  Abdomen - Normal bowel sounds, abdomen soft and nontender  Extremeties - No edema, SCD's opn b/l le  Neurological – no focal deficits    LABS:                        12.7   17.6  )-----------( 189      ( 05 Apr 2017 08:15 )             33.6     04-05    141  |  108  |  30<H>  ----------------------------<  107<H>  4.4   |  24  |  0.98    Ca    8.7      05 Apr 2017 08:15      PT/INR - ( 04 Apr 2017 09:25 )   PT: 10.9 sec;   INR: 0.98          PTT - ( 04 Apr 2017 09:25 )  PTT:34.8 sec      RADIOLOGY & ADDITIONAL TESTS:

## 2017-04-06 NOTE — PROGRESS NOTE ADULT - ASSESSMENT
82 yo M with BPH, Spinal stenosis, atrial fibrillation on xarelto with severe right hip pain now s/p elective right THR  1: A Fib - restart xarelto, continue metoprolol  2: BPH: continue tamsulosin and finasteride  3: Pain mgment / bowel regimen  4: DVT PPX: on lovenox - dc once xarelto restarted, continue SCDs while in bed

## 2017-04-06 NOTE — PROGRESS NOTE ADULT - SUBJECTIVE AND OBJECTIVE BOX
SUBJECTIVE: Patient seen and examined. Pt doing well o/n.  No f/c/n/v/cp/sob.   Complains of stiffness.    OBJECTIVE:      Vital Signs Last 24 Hrs  T(C): 37.4, Max: 37.7 (04-05 @ 20:34)  T(F): 99.3, Max: 99.9 (04-05 @ 20:34)  HR: 77 (68 - 77)  BP: 118/63 (92/51 - 120/57)  BP(mean): --  RR: 15 (15 - 17)  SpO2: 97% (96% - 100%)    Affected extremity:          Dressing: clean/dry/intact            Sensation: SILT         Motor exam: 5/5 TA/GS/EHL         warm well perfused; capillary refill <3 seconds     LABS:                        12.7   17.6  )-----------( 189      ( 05 Apr 2017 08:15 )             33.6     04-05    141  |  108  |  30<H>  ----------------------------<  107<H>  4.4   |  24  |  0.98    Ca    8.7      05 Apr 2017 08:15      PT/INR - ( 04 Apr 2017 09:25 )   PT: 10.9 sec;   INR: 0.98          PTT - ( 04 Apr 2017 09:25 )  PTT:34.8 sec    MEDICATIONS:lactated ringers. 1000milliLiter(s) IV Continuous <Continuous>  enoxaparin Injectable 30milliGRAM(s) SubCutaneous every 12 hours  acetaminophen   Tablet 650milliGRAM(s) Oral every 6 hours PRN  celecoxib 100milliGRAM(s) Oral two times a day after meals  oxyCODONE IR 5milliGRAM(s) Oral every 4 hours PRN  oxyCODONE IR 10milliGRAM(s) Oral every 4 hours PRN  BUpivacaine liposome 1.3% Injectable (no eMAR) 20milliLiter(s) Local Injection once  morphine  - Injectable 2milliGRAM(s) IV Push every 3 hours PRN  aluminum hydroxide/magnesium hydroxide/simethicone Suspension 30milliLiter(s) Oral four times a day PRN  ondansetron Injectable 4milliGRAM(s) IV Push every 6 hours PRN  polyethylene glycol 3350 17Gram(s) Oral daily  senna 2Tablet(s) Oral at bedtime  magnesium hydroxide Suspension 30milliLiter(s) Oral daily PRN  docusate sodium 100milliGRAM(s) Oral three times a day  morphine  - Injectable 2milliGRAM(s) IV Push every 15 minutes PRN  finasteride 5milliGRAM(s) Oral daily  tamsulosin 0.4milliGRAM(s) Oral at bedtime  metoprolol 12.5milliGRAM(s) Oral daily  pantoprazole    Tablet 40milliGRAM(s) Oral before breakfast    Anticoagulation:  enoxaparin Injectable 30milliGRAM(s) SubCutaneous every 12 hours    Antibiotics:     Pain medications:   acetaminophen   Tablet 650milliGRAM(s) Oral every 6 hours PRN  celecoxib 100milliGRAM(s) Oral two times a day after meals  oxyCODONE IR 5milliGRAM(s) Oral every 4 hours PRN  oxyCODONE IR 10milliGRAM(s) Oral every 4 hours PRN  morphine  - Injectable 2milliGRAM(s) IV Push every 3 hours PRN  ondansetron Injectable 4milliGRAM(s) IV Push every 6 hours PRN  morphine  - Injectable 2milliGRAM(s) IV Push every 15 minutes PRN    A/P :  Pt is a 82yo Male s/p R MARSHA 4/4/17 POD # 2  -    Pain control  -    DVT ppx: Lovenox     -    Weight bearing status: WBAT   -    Physical Therapy  -    Dispo: Home

## 2017-04-07 VITALS
HEART RATE: 75 BPM | RESPIRATION RATE: 18 BRPM | OXYGEN SATURATION: 98 % | DIASTOLIC BLOOD PRESSURE: 56 MMHG | SYSTOLIC BLOOD PRESSURE: 113 MMHG

## 2017-04-07 LAB — SURGICAL PATHOLOGY STUDY: SIGNIFICANT CHANGE UP

## 2017-04-07 PROCEDURE — 86900 BLOOD TYPING SEROLOGIC ABO: CPT

## 2017-04-07 PROCEDURE — 86901 BLOOD TYPING SEROLOGIC RH(D): CPT

## 2017-04-07 PROCEDURE — 99232 SBSQ HOSP IP/OBS MODERATE 35: CPT

## 2017-04-07 PROCEDURE — 88300 SURGICAL PATH GROSS: CPT

## 2017-04-07 PROCEDURE — 97116 GAIT TRAINING THERAPY: CPT

## 2017-04-07 PROCEDURE — 86850 RBC ANTIBODY SCREEN: CPT

## 2017-04-07 PROCEDURE — 85730 THROMBOPLASTIN TIME PARTIAL: CPT

## 2017-04-07 PROCEDURE — 85027 COMPLETE CBC AUTOMATED: CPT

## 2017-04-07 PROCEDURE — 80048 BASIC METABOLIC PNL TOTAL CA: CPT

## 2017-04-07 PROCEDURE — C1776: CPT

## 2017-04-07 PROCEDURE — 97161 PT EVAL LOW COMPLEX 20 MIN: CPT

## 2017-04-07 PROCEDURE — 85610 PROTHROMBIN TIME: CPT

## 2017-04-07 PROCEDURE — 36415 COLL VENOUS BLD VENIPUNCTURE: CPT

## 2017-04-07 PROCEDURE — 72170 X-RAY EXAM OF PELVIS: CPT

## 2017-04-07 RX ADMIN — CELECOXIB 100 MILLIGRAM(S): 200 CAPSULE ORAL at 09:08

## 2017-04-07 RX ADMIN — OXYCODONE HYDROCHLORIDE 5 MILLIGRAM(S): 5 TABLET ORAL at 09:07

## 2017-04-07 RX ADMIN — PANTOPRAZOLE SODIUM 40 MILLIGRAM(S): 20 TABLET, DELAYED RELEASE ORAL at 07:09

## 2017-04-07 RX ADMIN — POLYETHYLENE GLYCOL 3350 17 GRAM(S): 17 POWDER, FOR SOLUTION ORAL at 14:00

## 2017-04-07 RX ADMIN — OXYCODONE HYDROCHLORIDE 5 MILLIGRAM(S): 5 TABLET ORAL at 09:46

## 2017-04-07 RX ADMIN — FINASTERIDE 5 MILLIGRAM(S): 5 TABLET, FILM COATED ORAL at 12:05

## 2017-04-07 RX ADMIN — CELECOXIB 100 MILLIGRAM(S): 200 CAPSULE ORAL at 11:45

## 2017-04-07 RX ADMIN — Medication 100 MILLIGRAM(S): at 07:09

## 2017-04-07 RX ADMIN — ENOXAPARIN SODIUM 30 MILLIGRAM(S): 100 INJECTION SUBCUTANEOUS at 07:09

## 2017-04-07 RX ADMIN — Medication 100 MILLIGRAM(S): at 14:00

## 2017-04-07 NOTE — PROGRESS NOTE ADULT - SUBJECTIVE AND OBJECTIVE BOX
ORTHO NOTE    [x ] Pt seen/examined. 7:50am  [ ] Pt without any complaints/in NAD.    [x ] Pt complains of: Incisional pain with movements,     [ ] ROS  otherwise negative    .    PHYSICAL EXAM:    Vital Signs Last 24 Hrs  T(C): 36.7, Max: 38.2 (04-06 @ 16:24)  T(F): 98.1, Max: 100.8 (04-06 @ 16:24)  HR: 75 (72 - 80)  BP: 113/56 (109/53 - 124/72)  BP(mean): --  RR: 18 (16 - 18)  SpO2: 98% (95% - 98%)    I&O's Detail  I & Os for 24h ending 07 Apr 2017 07:00  =============================================  IN:    Oral Fluid: 2020 ml    Total IN: 2020 ml  ---------------------------------------------  OUT:    Voided: 2050 ml    Total OUT: 2050 ml  ---------------------------------------------  Total NET: -30 ml    I & Os for current day (as of 07 Apr 2017 14:19)  =============================================  IN:    Oral Fluid: 200 ml    Total IN: 200 ml  ---------------------------------------------  OUT:    Voided: 300 ml    Total OUT: 300 ml  ---------------------------------------------  Total NET: -100 ml       CAPILLARY BLOOD GLUCOSE                  Neuro: A&0x3    Lungs: Denies SOB    CV: Denies chest pain    ABD: NON distended positive bowel sounds      Ext: NVID Dressing clean dry and intact.      LABS                        13.3   15.8  )-----------( 215      ( 06 Apr 2017 11:13 )             35.7                                04-06    139  |  106  |  24<H>  ----------------------------<  118<H>  4.4   |  23  |  1.12    Ca    8.9      06 Apr 2017 11:13         ASSESSMENT/PLAN:      STATUS POST: Right MARSHA (posterior)    CONTINUE:          x[ ] PT WBAT    [ x] DVT PPX- Lovenox in house then, Xarelto on D/C.    [x ] Pain MgtMEDICATIONS  (PRN):  oxyCODONE IR 5milliGRAM(s) Oral every 4 hours PRN Mild Pain  oxyCODONE IR 10milliGRAM(s) Oral every 4 hours PRN Moderate Pain  morphine  - Injectable 2milliGRAM(s) IV Push every 3 hours PRN Severe Pain  morphine  - Injectable 2milliGRAM(s) IV Push every 15 minutes PRN Severe Pain (7 - 10)      [x ] Dispo plan- Home

## 2017-04-07 NOTE — PROGRESS NOTE ADULT - SUBJECTIVE AND OBJECTIVE BOX
SUBJECTIVE: Patient seen and examined. Pt doing well o/n.  No f/c/n/v/cp/sob.   Complains of stiffness.    OBJECTIVE:      ICU Vital Signs Last 24 Hrs  T(C): 36.7, Max: 38.2 (04-06 @ 16:24)  T(F): 98.1, Max: 100.8 (04-06 @ 16:24)  HR: 74 (63 - 80)  BP: 121/70 (95/50 - 124/72)  BP(mean): --  ABP: --  ABP(mean): --  RR: 17 (16 - 17)  SpO2: 97% (95% - 98%)      Affected extremity:          Dressing: clean/dry/intact            Sensation: SILT         Motor exam: 5/5 TA/GS/EHL         warm well perfused; capillary refill <3 seconds     LABS:                        12.7   17.6  )-----------( 189      ( 05 Apr 2017 08:15 )             33.6     04-05    141  |  108  |  30<H>  ----------------------------<  107<H>  4.4   |  24  |  0.98    Ca    8.7      05 Apr 2017 08:15      PT/INR - ( 04 Apr 2017 09:25 )   PT: 10.9 sec;   INR: 0.98          PTT - ( 04 Apr 2017 09:25 )  PTT:34.8 sec    MEDICATIONS:lactated ringers. 1000milliLiter(s) IV Continuous <Continuous>  enoxaparin Injectable 30milliGRAM(s) SubCutaneous every 12 hours  acetaminophen   Tablet 650milliGRAM(s) Oral every 6 hours PRN  celecoxib 100milliGRAM(s) Oral two times a day after meals  oxyCODONE IR 5milliGRAM(s) Oral every 4 hours PRN  oxyCODONE IR 10milliGRAM(s) Oral every 4 hours PRN  BUpivacaine liposome 1.3% Injectable (no eMAR) 20milliLiter(s) Local Injection once  morphine  - Injectable 2milliGRAM(s) IV Push every 3 hours PRN  aluminum hydroxide/magnesium hydroxide/simethicone Suspension 30milliLiter(s) Oral four times a day PRN  ondansetron Injectable 4milliGRAM(s) IV Push every 6 hours PRN  polyethylene glycol 3350 17Gram(s) Oral daily  senna 2Tablet(s) Oral at bedtime  magnesium hydroxide Suspension 30milliLiter(s) Oral daily PRN  docusate sodium 100milliGRAM(s) Oral three times a day  morphine  - Injectable 2milliGRAM(s) IV Push every 15 minutes PRN  finasteride 5milliGRAM(s) Oral daily  tamsulosin 0.4milliGRAM(s) Oral at bedtime  metoprolol 12.5milliGRAM(s) Oral daily  pantoprazole    Tablet 40milliGRAM(s) Oral before breakfast    Anticoagulation:  enoxaparin Injectable 30milliGRAM(s) SubCutaneous every 12 hours    Antibiotics:     Pain medications:   acetaminophen   Tablet 650milliGRAM(s) Oral every 6 hours PRN  celecoxib 100milliGRAM(s) Oral two times a day after meals  oxyCODONE IR 5milliGRAM(s) Oral every 4 hours PRN  oxyCODONE IR 10milliGRAM(s) Oral every 4 hours PRN  morphine  - Injectable 2milliGRAM(s) IV Push every 3 hours PRN  ondansetron Injectable 4milliGRAM(s) IV Push every 6 hours PRN  morphine  - Injectable 2milliGRAM(s) IV Push every 15 minutes PRN    A/P :  Pt is a 82yo Male s/p R MARSHA 4/4/17 POD # 3  -    Pain control  -    DVT ppx: Lovenox     -    Weight bearing status: WBAT   -    Physical Therapy  -    Dispo: Home today

## 2017-04-07 NOTE — PROGRESS NOTE ADULT - SUBJECTIVE AND OBJECTIVE BOX
INTERVAL HPI/OVERNIGHT EVENTS:  much less stiffness.  no bm yet but pos flatus      MEDICATIONS  (STANDING):  lactated ringers. 1000milliLiter(s) IV Continuous <Continuous>  enoxaparin Injectable 30milliGRAM(s) SubCutaneous every 12 hours  celecoxib 100milliGRAM(s) Oral two times a day after meals  BUpivacaine liposome 1.3% Injectable (no eMAR) 20milliLiter(s) Local Injection once  polyethylene glycol 3350 17Gram(s) Oral daily  senna 2Tablet(s) Oral at bedtime  docusate sodium 100milliGRAM(s) Oral three times a day  finasteride 5milliGRAM(s) Oral daily  tamsulosin 0.4milliGRAM(s) Oral at bedtime  metoprolol 12.5milliGRAM(s) Oral daily  pantoprazole    Tablet 40milliGRAM(s) Oral before breakfast  scopolamine   Patch 1.5milliGRAM(s) Transdermal once    MEDICATIONS  (PRN):  acetaminophen   Tablet 650milliGRAM(s) Oral every 6 hours PRN For Temp over 38.3 C (100.94 F)  oxyCODONE IR 5milliGRAM(s) Oral every 4 hours PRN Mild Pain  oxyCODONE IR 10milliGRAM(s) Oral every 4 hours PRN Moderate Pain  morphine  - Injectable 2milliGRAM(s) IV Push every 3 hours PRN Severe Pain  aluminum hydroxide/magnesium hydroxide/simethicone Suspension 30milliLiter(s) Oral four times a day PRN Indigestion  ondansetron Injectable 4milliGRAM(s) IV Push every 6 hours PRN Nausea and/or Vomiting  bisacodyl Suppository 10milliGRAM(s) Rectal daily PRN If no bowel movement by POD#2  magnesium hydroxide Suspension 30milliLiter(s) Oral daily PRN Constipation  morphine  - Injectable 2milliGRAM(s) IV Push every 15 minutes PRN Severe Pain (7 - 10)      Allergies    No Known Allergies      REVIEW OF SYSTEMS:    CONSTITUTIONAL:  No night sweats.  Some fatigue,sleeping better  No fever or chills.  HEENT:  Eyes:  No visual changes.    RESPIRATORY:  No cough.  No wheeze.    No shortness of breath.  CARDIOVASCULAR:  No chest pains.  No palpitations.  GASTROINTESTINAL:  No abdominal pain.  No nausea or vomiting.    GENITOURINARY:  No urgency.  No frequency.  No dysuria.   MUSCULOSKELETAL:less stiffnesst     SKIN:  No rashes.  No lesions.      T(C): 36.7, Max: 38.2 (04-06 @ 16:24)  HR: 74 (63 - 80)  BP: 121/70 (95/50 - 124/72)  RR: 17 (16 - 17)  SpO2: 97% (95% - 98%)  Wt(kg): --    PHYSICAL EXAM:   General - NAD, Alert and oriented x 3,   Cardiovascular - RRR  3/6 sam /r/g, no JVD  Lungs - Clear to auscltation, no use of acessory muscles, no crackles or wheezes.  Skin - No rashes, skin warm and dry, no erythematous areas  Abdomen - Normal bowel sounds, abdomen soft and nontender  Extremeties - No edema, SCD's opn b/l le      LABS:                        13.3   15.8  )-----------( 215      ( 06 Apr 2017 11:13 )             35.7     04-06    139  |  106  |  24<H>  ----------------------------<  118<H>  4.4   |  23  |  1.12    Ca    8.9                  RADIOLOGY & ADDITIONAL TESTS:

## 2017-04-07 NOTE — PROGRESS NOTE ADULT - ASSESSMENT
80 yo M with BPH, Spinal stenosis, atrial fibrillation on xarelto with severe right hip pain now s/p elective right THR  1: A Fib - Plan to restart xarelto, continue metoprolol  2: BPH: continue tamsulosin and finasteride  3: Pain mgment / bowel regimen  4: DVT PPX: on lovenox - dc once xarelto restarted, continue SCDs while in bed

## 2017-04-10 DIAGNOSIS — N40.0 BENIGN PROSTATIC HYPERPLASIA WITHOUT LOWER URINARY TRACT SYMPTOMS: ICD-10-CM

## 2017-04-10 DIAGNOSIS — M16.11 UNILATERAL PRIMARY OSTEOARTHRITIS, RIGHT HIP: ICD-10-CM

## 2017-04-10 DIAGNOSIS — Z28.21 IMMUNIZATION NOT CARRIED OUT BECAUSE OF PATIENT REFUSAL: ICD-10-CM

## 2017-04-10 DIAGNOSIS — M48.00 SPINAL STENOSIS, SITE UNSPECIFIED: ICD-10-CM

## 2017-04-10 DIAGNOSIS — I48.91 UNSPECIFIED ATRIAL FIBRILLATION: ICD-10-CM

## 2017-04-10 DIAGNOSIS — Z79.01 LONG TERM (CURRENT) USE OF ANTICOAGULANTS: ICD-10-CM

## 2017-04-25 ENCOUNTER — FORM ENCOUNTER (OUTPATIENT)
Age: 82
End: 2017-04-25

## 2017-04-26 ENCOUNTER — OUTPATIENT (OUTPATIENT)
Dept: OUTPATIENT SERVICES | Facility: HOSPITAL | Age: 82
LOS: 1 days | End: 2017-04-26
Payer: MEDICARE

## 2017-04-26 ENCOUNTER — APPOINTMENT (OUTPATIENT)
Dept: ORTHOPEDIC SURGERY | Facility: CLINIC | Age: 82
End: 2017-04-26

## 2017-04-26 DIAGNOSIS — Z96.642 PRESENCE OF LEFT ARTIFICIAL HIP JOINT: Chronic | ICD-10-CM

## 2017-04-26 DIAGNOSIS — Z98.89 OTHER SPECIFIED POSTPROCEDURAL STATES: Chronic | ICD-10-CM

## 2017-04-26 DIAGNOSIS — Z90.89 ACQUIRED ABSENCE OF OTHER ORGANS: Chronic | ICD-10-CM

## 2017-04-26 PROBLEM — N40.0 BENIGN PROSTATIC HYPERPLASIA WITHOUT LOWER URINARY TRACT SYMPTOMS: Chronic | Status: ACTIVE | Noted: 2017-04-03

## 2017-04-26 PROCEDURE — 73501 X-RAY EXAM HIP UNI 1 VIEW: CPT | Mod: 26,RT

## 2017-04-26 PROCEDURE — 73501 X-RAY EXAM HIP UNI 1 VIEW: CPT

## 2017-04-26 RX ORDER — VARDENAFIL HYDROCHLORIDE 20 MG/1
20 TABLET, FILM COATED ORAL
Qty: 10 | Refills: 0 | Status: DISCONTINUED | COMMUNITY
Start: 2016-10-07 | End: 2017-04-26

## 2017-04-26 RX ORDER — OXYCODONE AND ACETAMINOPHEN 5; 325 MG/1; MG/1
5-325 TABLET ORAL
Qty: 70 | Refills: 0 | Status: DISCONTINUED | COMMUNITY
Start: 2017-03-30 | End: 2017-04-26

## 2017-04-26 RX ORDER — CELECOXIB 100 MG/1
100 CAPSULE ORAL
Qty: 84 | Refills: 0 | Status: DISCONTINUED | COMMUNITY
Start: 2017-03-30 | End: 2017-04-26

## 2017-05-26 ENCOUNTER — APPOINTMENT (OUTPATIENT)
Dept: ORTHOPEDIC SURGERY | Facility: CLINIC | Age: 82
End: 2017-05-26

## 2017-05-26 DIAGNOSIS — Z96.641 AFTERCARE FOLLOWING JOINT REPLACEMENT SURGERY: ICD-10-CM

## 2017-05-26 DIAGNOSIS — Z87.19 PERSONAL HISTORY OF OTHER DISEASES OF THE DIGESTIVE SYSTEM: ICD-10-CM

## 2017-05-26 DIAGNOSIS — Z47.1 AFTERCARE FOLLOWING JOINT REPLACEMENT SURGERY: ICD-10-CM

## 2017-05-26 DIAGNOSIS — M16.11 UNILATERAL PRIMARY OSTEOARTHRITIS, RIGHT HIP: ICD-10-CM

## 2017-07-17 ENCOUNTER — APPOINTMENT (OUTPATIENT)
Dept: SURGERY | Facility: CLINIC | Age: 82
End: 2017-07-17

## 2017-08-04 ENCOUNTER — APPOINTMENT (OUTPATIENT)
Dept: INTERNAL MEDICINE | Facility: CLINIC | Age: 82
End: 2017-08-04
Payer: MEDICARE

## 2017-08-04 VITALS
HEIGHT: 68.5 IN | TEMPERATURE: 97.4 F | DIASTOLIC BLOOD PRESSURE: 60 MMHG | OXYGEN SATURATION: 97 % | SYSTOLIC BLOOD PRESSURE: 112 MMHG | BODY MASS INDEX: 23.97 KG/M2 | HEART RATE: 72 BPM | WEIGHT: 160 LBS

## 2017-08-04 DIAGNOSIS — R53.81 OTHER MALAISE: ICD-10-CM

## 2017-08-04 DIAGNOSIS — I95.9 HYPOTENSION, UNSPECIFIED: ICD-10-CM

## 2017-08-04 DIAGNOSIS — Z00.00 ENCOUNTER FOR GENERAL ADULT MEDICAL EXAMINATION W/OUT ABNORMAL FINDINGS: ICD-10-CM

## 2017-08-04 DIAGNOSIS — R53.83 OTHER MALAISE: ICD-10-CM

## 2017-08-04 DIAGNOSIS — R19.7 DIARRHEA, UNSPECIFIED: ICD-10-CM

## 2017-08-04 PROCEDURE — 99214 OFFICE O/P EST MOD 30 MIN: CPT

## 2018-01-05 NOTE — STUDENT SIGN OFF DOCUMENT - CO-SIGNATURE DATE
1/5/2018      Dmitri Lu  [address]   [place] [address]        To Whom it May Concern,    Dmitri Lu was in the clinic today for an appointment at 10 AM.  Patient was accompanied by Mom. Please excuse from school for illness.          Sincerely,          Sena Silva MD  [place] [id number]  [phone]           07-Apr-2017 11:53

## 2018-11-26 ENCOUNTER — APPOINTMENT (OUTPATIENT)
Dept: GASTROENTEROLOGY | Facility: CLINIC | Age: 83
End: 2018-11-26
Payer: MEDICARE

## 2018-11-26 VITALS
HEART RATE: 77 BPM | RESPIRATION RATE: 14 BRPM | WEIGHT: 158 LBS | OXYGEN SATURATION: 98 % | BODY MASS INDEX: 23.67 KG/M2 | DIASTOLIC BLOOD PRESSURE: 78 MMHG | SYSTOLIC BLOOD PRESSURE: 140 MMHG

## 2018-11-26 PROCEDURE — 99204 OFFICE O/P NEW MOD 45 MIN: CPT

## 2018-11-30 ENCOUNTER — RESULT REVIEW (OUTPATIENT)
Age: 83
End: 2018-11-30

## 2019-02-25 ENCOUNTER — APPOINTMENT (OUTPATIENT)
Dept: HEART AND VASCULAR | Facility: CLINIC | Age: 84
End: 2019-02-25
Payer: MEDICARE

## 2019-02-25 VITALS
HEIGHT: 68 IN | BODY MASS INDEX: 23.34 KG/M2 | RESPIRATION RATE: 13 BRPM | OXYGEN SATURATION: 98 % | SYSTOLIC BLOOD PRESSURE: 130 MMHG | TEMPERATURE: 98.4 F | WEIGHT: 154 LBS | DIASTOLIC BLOOD PRESSURE: 70 MMHG | HEART RATE: 74 BPM

## 2019-02-25 DIAGNOSIS — Z47.1 AFTERCARE FOLLOWING JOINT REPLACEMENT SURGERY: ICD-10-CM

## 2019-02-25 DIAGNOSIS — I48.0 PAROXYSMAL ATRIAL FIBRILLATION: ICD-10-CM

## 2019-02-25 DIAGNOSIS — R63.4 ABNORMAL WEIGHT LOSS: ICD-10-CM

## 2019-02-25 DIAGNOSIS — K22.70 BARRETT'S ESOPHAGUS W/OUT DYSPLASIA: ICD-10-CM

## 2019-02-25 DIAGNOSIS — Z01.818 ENCOUNTER FOR OTHER PREPROCEDURAL EXAMINATION: ICD-10-CM

## 2019-02-25 DIAGNOSIS — M16.12 UNILATERAL PRIMARY OSTEOARTHRITIS, LEFT HIP: ICD-10-CM

## 2019-02-25 DIAGNOSIS — Z96.642 AFTERCARE FOLLOWING JOINT REPLACEMENT SURGERY: ICD-10-CM

## 2019-02-25 PROCEDURE — 99214 OFFICE O/P EST MOD 30 MIN: CPT

## 2019-02-25 RX ORDER — OMEPRAZOLE 40 MG/1
40 CAPSULE, DELAYED RELEASE ORAL
Qty: 30 | Refills: 1 | Status: ACTIVE | COMMUNITY
Start: 2019-02-25

## 2019-02-25 RX ORDER — TAMSULOSIN HYDROCHLORIDE 0.4 MG/1
CAPSULE ORAL
Refills: 0 | Status: DISCONTINUED | COMMUNITY
End: 2019-02-25

## 2019-02-25 RX ORDER — SILDENAFIL CITRATE 100 MG/1
100 TABLET, FILM COATED ORAL
Qty: 8 | Refills: 0 | Status: DISCONTINUED | COMMUNITY
Start: 2016-08-22 | End: 2019-02-25

## 2019-02-25 RX ORDER — METOPROLOL SUCCINATE 25 MG/1
25 TABLET, EXTENDED RELEASE ORAL DAILY
Qty: 1 | Refills: 0 | Status: ACTIVE | COMMUNITY
Start: 2016-03-28

## 2019-02-25 RX ORDER — FINASTERIDE 5 MG/1
5 TABLET, FILM COATED ORAL
Refills: 0 | Status: DISCONTINUED | COMMUNITY
End: 2019-02-25

## 2019-02-25 NOTE — PHYSICAL EXAM
[General Appearance - Well Developed] : well developed [Normal Appearance] : normal appearance [Well Groomed] : well groomed [General Appearance - Well Nourished] : well nourished [No Deformities] : no deformities [General Appearance - In No Acute Distress] : no acute distress [Normal Conjunctiva] : the conjunctiva exhibited no abnormalities [Respiration, Rhythm And Depth] : normal respiratory rhythm and effort [Exaggerated Use Of Accessory Muscles For Inspiration] : no accessory muscle use [Auscultation Breath Sounds / Voice Sounds] : lungs were clear to auscultation bilaterally [Heart Sounds] : normal S1 and S2 [Abdomen Soft] : soft [Abdomen Tenderness] : non-tender [] : no hepato-splenomegaly [Abdomen Mass (___ Cm)] : no abdominal mass palpated [Abnormal Walk] : normal gait [Skin Turgor] : normal skin turgor [Oriented To Time, Place, And Person] : oriented to person, place, and time [Affect] : the affect was normal [Mood] : the mood was normal [No Anxiety] : not feeling anxious

## 2019-02-25 NOTE — DISCUSSION/SUMMARY
[FreeTextEntry1] : Weight loss, Elevated PSA, Dowd's --We discussed that although his weight loss may be related to a change to a vegan diet, I suggested that he obtain prior records performed at [facility] and obtain records of Dr Brizuela. I asked him to see Dr Mo of  and meet  in second opinion

## 2019-02-26 ENCOUNTER — MESSAGE (OUTPATIENT)
Age: 84
End: 2019-02-26

## 2019-03-05 ENCOUNTER — APPOINTMENT (OUTPATIENT)
Dept: UROLOGY | Facility: CLINIC | Age: 84
End: 2019-03-05
Payer: MEDICARE

## 2019-03-05 VITALS
HEIGHT: 68 IN | TEMPERATURE: 97.4 F | SYSTOLIC BLOOD PRESSURE: 130 MMHG | BODY MASS INDEX: 23.64 KG/M2 | DIASTOLIC BLOOD PRESSURE: 69 MMHG | WEIGHT: 156 LBS

## 2019-03-05 PROCEDURE — 99204 OFFICE O/P NEW MOD 45 MIN: CPT

## 2019-03-05 RX ORDER — FINASTERIDE 5 MG/1
5 TABLET, FILM COATED ORAL DAILY
Qty: 90 | Refills: 3 | Status: ACTIVE | COMMUNITY
Start: 2019-03-05 | End: 1900-01-01

## 2019-03-05 RX ORDER — TAMSULOSIN HYDROCHLORIDE 0.4 MG/1
0.4 CAPSULE ORAL
Qty: 30 | Refills: 11 | Status: ACTIVE | COMMUNITY
Start: 2019-03-05 | End: 1900-01-01

## 2019-03-05 NOTE — ASSESSMENT
[FreeTextEntry1] : elevated PSA\par repeat PSA today\par advised to retrieve biopsy records\par if PSA in similar range weight loss unlikely to be prostate cancer related\par \par BPH\par advised to restart flomax finasteride\par f/u 6 months\par may proceed to spinal surgery as long as PSA in similar range

## 2019-03-05 NOTE — PHYSICAL EXAM
[General Appearance - Well Developed] : well developed [General Appearance - Well Nourished] : well nourished [Normal Appearance] : normal appearance [Well Groomed] : well groomed [Heart Rate And Rhythm] : Heart rate and rhythm were normal [] : no respiratory distress [Abdomen Soft] : soft [Abdomen Tenderness] : non-tender [Abdomen Hernia] : no hernia was discovered [Costovertebral Angle Tenderness] : no ~M costovertebral angle tenderness [Urethral Meatus] : meatus normal [Penis Abnormality] : normal circumcised penis [Urinary Bladder Findings] : the bladder was normal on palpation [Scrotum] : the scrotum was normal [Epididymis] : the epididymides were normal [Testes Tenderness] : no tenderness of the testes [Testes Mass (___cm)] : there were no testicular masses [Normal Station and Gait] : the gait and station were normal for the patient's age [Skin Color & Pigmentation] : normal skin color and pigmentation [No Focal Deficits] : no focal deficits [Oriented To Time, Place, And Person] : oriented to person, place, and time [No Palpable Adenopathy] : no palpable adenopathy [FreeTextEntry1] : >>50 gram prostaet very firm. no clear induration

## 2019-03-06 ENCOUNTER — RESULT REVIEW (OUTPATIENT)
Age: 84
End: 2019-03-06

## 2019-03-06 LAB
APPEARANCE: CLEAR
BACTERIA: NEGATIVE
BILIRUBIN URINE: NEGATIVE
BLOOD URINE: NORMAL
COLOR: YELLOW
GLUCOSE QUALITATIVE U: NEGATIVE
HYALINE CASTS: 3 /LPF
KETONES URINE: NEGATIVE
LEUKOCYTE ESTERASE URINE: NEGATIVE
MICROSCOPIC-UA: NORMAL
NITRITE URINE: NEGATIVE
PH URINE: 6
PROTEIN URINE: NORMAL
RED BLOOD CELLS URINE: 10 /HPF
SPECIFIC GRAVITY URINE: 1.02
SQUAMOUS EPITHELIAL CELLS: 0 /HPF
UROBILINOGEN URINE: NORMAL
WHITE BLOOD CELLS URINE: 1 /HPF

## 2019-03-07 ENCOUNTER — RESULT REVIEW (OUTPATIENT)
Age: 84
End: 2019-03-07

## 2019-03-11 LAB
BACTERIA UR CULT: NORMAL
PSA SERPL-MCNC: 14.4 NG/ML

## 2019-03-12 ENCOUNTER — APPOINTMENT (OUTPATIENT)
Dept: INTERNAL MEDICINE | Facility: CLINIC | Age: 84
End: 2019-03-12
Payer: MEDICARE

## 2019-03-12 VITALS
TEMPERATURE: 97.7 F | OXYGEN SATURATION: 98 % | DIASTOLIC BLOOD PRESSURE: 60 MMHG | BODY MASS INDEX: 23.42 KG/M2 | WEIGHT: 154 LBS | SYSTOLIC BLOOD PRESSURE: 112 MMHG | HEART RATE: 74 BPM | RESPIRATION RATE: 14 BRPM

## 2019-03-12 DIAGNOSIS — M41.9 SCOLIOSIS, UNSPECIFIED: ICD-10-CM

## 2019-03-12 DIAGNOSIS — M47.816 SPONDYLOSIS W/OUT MYELOPATHY OR RADICULOPATHY, LUMBAR REGION: ICD-10-CM

## 2019-03-12 DIAGNOSIS — R26.9 UNSPECIFIED ABNORMALITIES OF GAIT AND MOBILITY: ICD-10-CM

## 2019-03-12 PROCEDURE — 99214 OFFICE O/P EST MOD 30 MIN: CPT

## 2019-03-12 RX ORDER — ALPRAZOLAM 0.25 MG/1
0.25 TABLET ORAL
Qty: 4 | Refills: 0 | Status: ACTIVE | COMMUNITY
Start: 2019-03-12 | End: 1900-01-01

## 2019-03-12 NOTE — HISTORY OF PRESENT ILLNESS
[FreeTextEntry1] : back issues [de-identified] : With sig back discomfort and scoliosis - considering surgery.  In the process of getting medical clearance.  \par CUrrently being worked up for microhematuria  He is very worried about the cystoscopy.\par Cannot walk a block but can ride a bike.\par He states is goal is to practice [employer] again\par Occasional aching both hips,

## 2019-03-12 NOTE — PLAN
[FreeTextEntry1] : \par \par Rx for alprazolam prior to the procedure\par \par Encourage a second opinion- He will go to [place]  the spine center there\par \par Will work on biking and swimming.\par

## 2019-03-12 NOTE — PHYSICAL EXAM
[No Acute Distress] : no acute distress [Well Nourished] : well nourished [Well Developed] : well developed [Normal Affect] : the affect was normal [Normal Insight/Judgement] : insight and judgment were intact [de-identified] : curvature of spine [de-identified] : abnormal gait

## 2019-04-11 ENCOUNTER — APPOINTMENT (OUTPATIENT)
Dept: UROLOGY | Facility: CLINIC | Age: 84
End: 2019-04-11
Payer: MEDICARE

## 2019-04-11 VITALS
TEMPERATURE: 98.3 F | BODY MASS INDEX: 23.34 KG/M2 | SYSTOLIC BLOOD PRESSURE: 134 MMHG | HEART RATE: 66 BPM | WEIGHT: 154 LBS | OXYGEN SATURATION: 98 % | HEIGHT: 68 IN | DIASTOLIC BLOOD PRESSURE: 70 MMHG

## 2019-04-11 DIAGNOSIS — R31.29 OTHER MICROSCOPIC HEMATURIA: ICD-10-CM

## 2019-04-11 PROCEDURE — 99213 OFFICE O/P EST LOW 20 MIN: CPT

## 2019-04-11 NOTE — HISTORY OF PRESENT ILLNESS
[FreeTextEntry1] : UA 10 RBC\par PSA 14\par very apprehensive about proceeding to cystoscopy\par no gross heamturia\par here to discuss

## 2019-04-12 LAB
APPEARANCE: CLEAR
BACTERIA: NEGATIVE
BILIRUBIN URINE: NEGATIVE
BLOOD URINE: NORMAL
COLOR: NORMAL
GLUCOSE QUALITATIVE U: NEGATIVE
HYALINE CASTS: 0 /LPF
KETONES URINE: NEGATIVE
LEUKOCYTE ESTERASE URINE: NEGATIVE
MICROSCOPIC-UA: NORMAL
NITRITE URINE: NEGATIVE
PH URINE: 6.5
PROTEIN URINE: NEGATIVE
RED BLOOD CELLS URINE: 3 /HPF
SPECIFIC GRAVITY URINE: 1.01
SQUAMOUS EPITHELIAL CELLS: 0 /HPF
UROBILINOGEN URINE: NORMAL
WHITE BLOOD CELLS URINE: 0 /HPF

## 2019-04-15 LAB — BACTERIA UR CULT: NORMAL

## 2019-04-21 ENCOUNTER — FORM ENCOUNTER (OUTPATIENT)
Age: 84
End: 2019-04-21

## 2019-04-22 ENCOUNTER — APPOINTMENT (OUTPATIENT)
Dept: CT IMAGING | Facility: HOSPITAL | Age: 84
End: 2019-04-22
Payer: MEDICARE

## 2019-04-22 ENCOUNTER — OUTPATIENT (OUTPATIENT)
Dept: OUTPATIENT SERVICES | Facility: HOSPITAL | Age: 84
LOS: 1 days | End: 2019-04-22
Payer: MEDICARE

## 2019-04-22 DIAGNOSIS — Z90.89 ACQUIRED ABSENCE OF OTHER ORGANS: Chronic | ICD-10-CM

## 2019-04-22 DIAGNOSIS — Z98.89 OTHER SPECIFIED POSTPROCEDURAL STATES: Chronic | ICD-10-CM

## 2019-04-22 DIAGNOSIS — Z96.642 PRESENCE OF LEFT ARTIFICIAL HIP JOINT: Chronic | ICD-10-CM

## 2019-04-22 PROCEDURE — 74178 CT ABD&PLV WO CNTR FLWD CNTR: CPT | Mod: 26

## 2019-04-22 PROCEDURE — 74178 CT ABD&PLV WO CNTR FLWD CNTR: CPT

## 2019-04-23 ENCOUNTER — APPOINTMENT (OUTPATIENT)
Dept: UROLOGY | Facility: CLINIC | Age: 84
End: 2019-04-23
Payer: MEDICARE

## 2019-04-23 VITALS
DIASTOLIC BLOOD PRESSURE: 66 MMHG | HEIGHT: 68 IN | BODY MASS INDEX: 23.34 KG/M2 | TEMPERATURE: 97.5 F | WEIGHT: 154 LBS | OXYGEN SATURATION: 98 % | HEART RATE: 70 BPM | SYSTOLIC BLOOD PRESSURE: 127 MMHG

## 2019-04-23 PROCEDURE — 52000 CYSTOURETHROSCOPY: CPT

## 2019-09-10 ENCOUNTER — APPOINTMENT (OUTPATIENT)
Dept: UROLOGY | Facility: CLINIC | Age: 84
End: 2019-09-10
Payer: MEDICARE

## 2019-09-10 VITALS — TEMPERATURE: 97.5 F | DIASTOLIC BLOOD PRESSURE: 72 MMHG | HEART RATE: 67 BPM | SYSTOLIC BLOOD PRESSURE: 146 MMHG

## 2019-09-10 PROCEDURE — 99213 OFFICE O/P EST LOW 20 MIN: CPT

## 2019-09-10 NOTE — ASSESSMENT
[FreeTextEntry1] : Elevated PSA\par -PSA today\par -If extremely elevated, consider bone scan\par \par Urinary Frequency\par -Hold fluids 1 to 2 hours before bedtime\par -UA/UCx\par -Continue tamsulosin \par \par Will discuss results over phone\par F/u in 6 months \par \par

## 2019-09-10 NOTE — PHYSICAL EXAM
[General Appearance - Well Developed] : well developed [General Appearance - Well Nourished] : well nourished [Normal Appearance] : normal appearance [Well Groomed] : well groomed [General Appearance - In No Acute Distress] : no acute distress [Abdomen Soft] : soft [Abdomen Tenderness] : non-tender [Costovertebral Angle Tenderness] : no ~M costovertebral angle tenderness [Urethral Meatus] : meatus normal [Urinary Bladder Findings] : the bladder was normal on palpation [Scrotum] : the scrotum was normal [Penis Abnormality] : normal circumcised penis [FreeTextEntry1] : Balta Le +1 edema  [] : no respiratory distress [Respiration, Rhythm And Depth] : normal respiratory rhythm and effort [Exaggerated Use Of Accessory Muscles For Inspiration] : no accessory muscle use [Oriented To Time, Place, And Person] : oriented to person, place, and time [Affect] : the affect was normal [Mood] : the mood was normal [Not Anxious] : not anxious

## 2019-09-10 NOTE — HISTORY OF PRESENT ILLNESS
[Nocturia] : nocturia [None] : None [Edema] : ~T edema was present [FreeTextEntry1] : 84M with elevated PSA and history of microhematuria.\par PSA= 14.4\par Nocturia hourly after spinal surgery 6 weeks ago. Notes he also has increased fluid intake around that time. ~ 10 oz. of fluids right before bedtime. \par No urinary complaints in the day time\par Able to control urine flow, both day and night \par Denies gross hematuria\par Has not used finasteride in a few months. No interest in restarting due to potential sexual dysfunction\par Restarted tamsulosin 2 days ago [Urinary Incontinence] : no urinary incontinence [Urinary Retention] : no urinary retention [Straining] : no straining [Intermittency] : no intermittency [Hematuria - Gross] : no gross hematuria [Abdominal Pain] : no abdominal pain [Flank Pain] : no flank pain [Weight Loss] : no recent ~M [unfilled] weight loss [Fever] : no fever [Fatigue] : no fatigue [Nausea] : no nausea [Anorexia] : no anorexia

## 2019-09-12 ENCOUNTER — RESULT REVIEW (OUTPATIENT)
Age: 84
End: 2019-09-12

## 2019-09-12 LAB
APPEARANCE: CLEAR
BACTERIA UR CULT: NORMAL
BACTERIA: NEGATIVE
BILIRUBIN URINE: NEGATIVE
BLOOD URINE: NEGATIVE
COLOR: YELLOW
GLUCOSE QUALITATIVE U: NEGATIVE
HYALINE CASTS: 2 /LPF
KETONES URINE: NEGATIVE
LEUKOCYTE ESTERASE URINE: NEGATIVE
MICROSCOPIC-UA: NORMAL
NITRITE URINE: NEGATIVE
PH URINE: 6
PROTEIN URINE: NORMAL
PSA FREE FLD-MCNC: 30 %
PSA FREE SERPL-MCNC: 3.91 NG/ML
PSA SERPL-MCNC: 13.2 NG/ML
RED BLOOD CELLS URINE: 4 /HPF
SPECIFIC GRAVITY URINE: 1.02
SQUAMOUS EPITHELIAL CELLS: 0 /HPF
UROBILINOGEN URINE: NORMAL
WHITE BLOOD CELLS URINE: 0 /HPF

## 2019-12-03 NOTE — PHYSICAL THERAPY INITIAL EVALUATION ADULT - RANGE OF MOTION EXAMINATION, REHAB EVAL
December 4, 2019       Dave Morris  [address]  [place] [place] [facility] [address]    Dear Mr. Morris,    Your procedure is scheduled with Grayson Hardy MD on December 23, 2019, at [facility] . You can expect to be contacted 1 to 3 days prior to the surgery to confirm arrival and surgery time. Occasionally these times may change.    Please arrive to register for your procedure.   The address of the facility is:      [address]  [address] [phone]    The following appointment(s) have been Or need to be scheduled for you:     Please schedule a 2 week post op with Dr Grayson Hardy Md   Here are instructions for your surgery:     · Do not eat or drink after midnight the night before your surgery.    · You will need someone to drive you home and remain with you up to 24 hours after you have been discharged.     · Starting 10 days prior to your surgery, please do not take any Phentermine or other diet/weight loss products.  Continuing these medications in the 10 days before surgery will likely result in postponement due to anesthesia requirements.  Please consult with your prescribing physicians if you have any questions.    · Do not take any anti--inflammatory medications (aspirin, ibuprofen, naproxen, etc) for 7 days before surgery.  Acetaminophen (Tylenol) is acceptable.                 If you have any work related and/or disability forms that need to be completed, please bring these forms to:  [facility]. It takes approximately 7 to 10 business days to complete these forms.    If you have any questions or need to reschedule, please contact me at the telephone number and extension listed below.     Thank you,    Yomaira [phone]  Surgery Scheduler for Grayson Hardy MD   [facility] Pre-Admit Department    Enclosures: [facility] Booklet       no ROM deficits were identified/except Right hip to 80 degrees flexion and precautions in extension

## 2019-12-10 ENCOUNTER — APPOINTMENT (OUTPATIENT)
Dept: UROLOGY | Facility: CLINIC | Age: 84
End: 2019-12-10
Payer: MEDICARE

## 2019-12-10 VITALS
HEART RATE: 65 BPM | TEMPERATURE: 97.5 F | DIASTOLIC BLOOD PRESSURE: 62 MMHG | BODY MASS INDEX: 23.42 KG/M2 | WEIGHT: 154 LBS | SYSTOLIC BLOOD PRESSURE: 123 MMHG

## 2019-12-10 DIAGNOSIS — R97.20 ELEVATED PROSTATE, SPECIFIC ANTIGEN [PSA]: ICD-10-CM

## 2019-12-10 PROCEDURE — 99213 OFFICE O/P EST LOW 20 MIN: CPT

## 2019-12-10 NOTE — HISTORY OF PRESENT ILLNESS
[FreeTextEntry1] : history elevated PSA 13-14\par s/p spinal fusion\par pain improving overall\par previous negative hematuria workup\par no longer on prostate medication\par nocutira x 4-5

## 2019-12-10 NOTE — ASSESSMENT
[FreeTextEntry1] : elevated PSA\par fluctuating 13-14 for many years per patient report\par will repeat PSA in 6months\par f/u after\par patient hesitant to proceed with any additonal medications or testing\par f/u 6months

## 2020-01-27 ENCOUNTER — APPOINTMENT (OUTPATIENT)
Dept: GASTROENTEROLOGY | Facility: CLINIC | Age: 85
End: 2020-01-27
Payer: MEDICARE

## 2020-01-27 VITALS
OXYGEN SATURATION: 98 % | DIASTOLIC BLOOD PRESSURE: 60 MMHG | RESPIRATION RATE: 14 BRPM | HEART RATE: 68 BPM | WEIGHT: 158 LBS | SYSTOLIC BLOOD PRESSURE: 116 MMHG | HEIGHT: 68 IN | BODY MASS INDEX: 23.95 KG/M2

## 2020-01-27 DIAGNOSIS — K21.9 GASTRO-ESOPHAGEAL REFLUX DISEASE W/OUT ESOPHAGITIS: ICD-10-CM

## 2020-01-27 DIAGNOSIS — R10.9 UNSPECIFIED ABDOMINAL PAIN: ICD-10-CM

## 2020-01-27 PROCEDURE — 99214 OFFICE O/P EST MOD 30 MIN: CPT | Mod: 25

## 2020-01-27 PROCEDURE — 36415 COLL VENOUS BLD VENIPUNCTURE: CPT

## 2020-01-27 NOTE — PHYSICAL EXAM
[General Appearance - Alert] : alert [General Appearance - In No Acute Distress] : in no acute distress [Sclera] : the sclera and conjunctiva were normal [Hearing Threshold Finger Rub Not Pleasants] : hearing was normal [Neck Appearance] : the appearance of the neck was normal [Heart Sounds] : normal S1 and S2 [Bowel Sounds] : normal bowel sounds [Abdomen Soft] : soft [No CVA Tenderness] : no ~M costovertebral angle tenderness [No Spinal Tenderness] : no spinal tenderness [Abnormal Walk] : normal gait [Skin Color & Pigmentation] : normal skin color and pigmentation [Oriented To Time, Place, And Person] : oriented to person, place, and time [General Appearance - Well Nourished] : well nourished [Outer Ear] : the ears and nose were normal in appearance [Oropharynx] : the oropharynx was normal [Neck Cervical Mass (___cm)] : no neck mass was observed [Respiration, Rhythm And Depth] : normal respiratory rhythm and effort [Heart Rate And Rhythm] : heart rate was normal and rhythm regular [Edema] : there was no peripheral edema [Abdomen Tenderness] : non-tender [Skin Turgor] : normal skin turgor [] : no rash [Impaired Insight] : insight and judgment were intact [Affect] : the affect was normal

## 2020-01-28 LAB
ALBUMIN SERPL ELPH-MCNC: 4.3 G/DL
ALP BLD-CCNC: 205 U/L
ALT SERPL-CCNC: 74 U/L
AMYLASE/CREAT SERPL: 52 U/L
ANION GAP SERPL CALC-SCNC: 13 MMOL/L
AST SERPL-CCNC: 118 U/L
BASOPHILS # BLD AUTO: 0.04 K/UL
BASOPHILS NFR BLD AUTO: 0.4 %
BILIRUB SERPL-MCNC: 1 MG/DL
BUN SERPL-MCNC: 26 MG/DL
CALCIUM SERPL-MCNC: 9.4 MG/DL
CHLORIDE SERPL-SCNC: 108 MMOL/L
CO2 SERPL-SCNC: 24 MMOL/L
CREAT SERPL-MCNC: 1.29 MG/DL
EOSINOPHIL # BLD AUTO: 0.2 K/UL
EOSINOPHIL NFR BLD AUTO: 2 %
GLUCOSE SERPL-MCNC: 90 MG/DL
HCT VFR BLD CALC: 38.9 %
HGB BLD-MCNC: 13.1 G/DL
IMM GRANULOCYTES NFR BLD AUTO: 0.5 %
LPL SERPL-CCNC: 23 U/L
LYMPHOCYTES # BLD AUTO: 0.85 K/UL
LYMPHOCYTES NFR BLD AUTO: 8.5 %
MAN DIFF?: NORMAL
MCHC RBC-ENTMCNC: 31 PG
MCHC RBC-ENTMCNC: 33.7 GM/DL
MCV RBC AUTO: 92.2 FL
MONOCYTES # BLD AUTO: 0.63 K/UL
MONOCYTES NFR BLD AUTO: 6.3 %
NEUTROPHILS # BLD AUTO: 8.25 K/UL
NEUTROPHILS NFR BLD AUTO: 82.3 %
PLATELET # BLD AUTO: 225 K/UL
POTASSIUM SERPL-SCNC: 5.1 MMOL/L
PROT SERPL-MCNC: 6.4 G/DL
RBC # BLD: 4.22 M/UL
RBC # FLD: 16.2 %
SODIUM SERPL-SCNC: 145 MMOL/L
WBC # FLD AUTO: 10.02 K/UL

## 2020-01-29 ENCOUNTER — MESSAGE (OUTPATIENT)
Age: 85
End: 2020-01-29

## 2020-01-30 PROBLEM — K21.9 GERD (GASTROESOPHAGEAL REFLUX DISEASE): Status: ACTIVE | Noted: 2019-02-25

## 2020-01-30 PROBLEM — R10.9 ABDOMINAL DISCOMFORT: Status: ACTIVE | Noted: 2020-01-30

## 2020-01-30 NOTE — HISTORY OF PRESENT ILLNESS
[Heartburn] : denies heartburn [Nausea] : denies nausea [Vomiting] : denies vomiting [Diarrhea] : denies diarrhea [Constipation] : denies constipation [Yellow Skin Or Eyes (Jaundice)] : denies jaundice [Abdominal Pain] : denies abdominal pain [de-identified] : 84M with pmhx of cholelithiasis s/p cholecystectomy, presents to clinic for follow up of abdominal pain \par \par 1/27/20\par - A month ago started getting attacks of abdominal pain, occurring once every 7-10 days, attacks last 45 minutes. Feels similar to prior when gallbladder was removed. \par - right in the middle of abdomen  above belly button, pain feels like tightness in abdomen, debilitating - 7/10 pain. SOB occurs with the pain\par - notices some weight loss over the past few months but gradually gaining some back  \par - denies nausea, vomiting, diarrhea, constipation or fevers\par - previous MRI MRCP (6/5/18): poor quality but likely some small stones in CBD and 3mm periampullary diverticulum medially. \par \par Previous history:\par He reports that approx 2.5 years ago after his cholecystectomy he had 2 episodes of abd pain c/w his his cholelithasis type pain prior to his surgery. His Primary GI ordered an MRCP which demonstrated stones. Since these 2 initial episodes he denies abd pain, n/v, jaundice, or f/c. He feels at baseline. He presents today for 2nd opinion regarding ERCP.

## 2020-01-30 NOTE — ASSESSMENT
[FreeTextEntry1] : 83M with pmhx of cholelithiasis s/p cholecystectomy (2.5 years ago) with recurrent abdominal pain.\par \par Abdominal pain, possible choledocholithiasis based on symptoms and prior imaging\par -schedule patient for EGD/EUS for further evaluation of CBD, r/a/i/b discussed and patient agreeable \par -collect CMP, CBC, amylase and lipase levels today \par  \par F/U post procedure \par \par Arabella Rodriguez, NP

## 2020-02-04 ENCOUNTER — INPATIENT (INPATIENT)
Facility: HOSPITAL | Age: 85
LOS: 0 days | Discharge: HOME | DRG: 393 | End: 2020-02-05
Attending: INTERNAL MEDICINE | Admitting: INTERNAL MEDICINE
Payer: COMMERCIAL

## 2020-02-04 ENCOUNTER — APPOINTMENT (OUTPATIENT)
Dept: GASTROENTEROLOGY | Facility: HOSPITAL | Age: 85
End: 2020-02-04

## 2020-02-04 ENCOUNTER — OUTPATIENT (OUTPATIENT)
Dept: OUTPATIENT SERVICES | Facility: HOSPITAL | Age: 85
LOS: 1 days | Discharge: HOME | End: 2020-02-04
Payer: MEDICARE

## 2020-02-04 VITALS
HEART RATE: 99 BPM | OXYGEN SATURATION: 96 % | RESPIRATION RATE: 18 BRPM | TEMPERATURE: 98 F | DIASTOLIC BLOOD PRESSURE: 85 MMHG | SYSTOLIC BLOOD PRESSURE: 161 MMHG

## 2020-02-04 DIAGNOSIS — R63.8 OTHER SYMPTOMS AND SIGNS CONCERNING FOOD AND FLUID INTAKE: ICD-10-CM

## 2020-02-04 DIAGNOSIS — Z98.89 OTHER SPECIFIED POSTPROCEDURAL STATES: Chronic | ICD-10-CM

## 2020-02-04 DIAGNOSIS — G89.18 OTHER ACUTE POSTPROCEDURAL PAIN: ICD-10-CM

## 2020-02-04 DIAGNOSIS — Z91.89 OTHER SPECIFIED PERSONAL RISK FACTORS, NOT ELSEWHERE CLASSIFIED: ICD-10-CM

## 2020-02-04 DIAGNOSIS — Z90.89 ACQUIRED ABSENCE OF OTHER ORGANS: Chronic | ICD-10-CM

## 2020-02-04 DIAGNOSIS — Z96.642 PRESENCE OF LEFT ARTIFICIAL HIP JOINT: Chronic | ICD-10-CM

## 2020-02-04 DIAGNOSIS — K80.50 CALCULUS OF BILE DUCT WITHOUT CHOLANGITIS OR CHOLECYSTITIS WITHOUT OBSTRUCTION: ICD-10-CM

## 2020-02-04 DIAGNOSIS — I48.91 UNSPECIFIED ATRIAL FIBRILLATION: ICD-10-CM

## 2020-02-04 LAB
ALBUMIN SERPL ELPH-MCNC: 3.7 G/DL — SIGNIFICANT CHANGE UP (ref 3.3–5)
ALP SERPL-CCNC: 150 U/L — HIGH (ref 40–120)
ALT FLD-CCNC: 29 U/L — SIGNIFICANT CHANGE UP (ref 10–45)
ANION GAP SERPL CALC-SCNC: 12 MMOL/L — SIGNIFICANT CHANGE UP (ref 5–17)
AST SERPL-CCNC: 20 U/L — SIGNIFICANT CHANGE UP (ref 10–40)
BASOPHILS # BLD AUTO: 0.04 K/UL — SIGNIFICANT CHANGE UP (ref 0–0.2)
BASOPHILS NFR BLD AUTO: 0.4 % — SIGNIFICANT CHANGE UP (ref 0–2)
BILIRUB SERPL-MCNC: 0.8 MG/DL — SIGNIFICANT CHANGE UP (ref 0.2–1.2)
BUN SERPL-MCNC: 21 MG/DL — SIGNIFICANT CHANGE UP (ref 7–23)
CALCIUM SERPL-MCNC: 9 MG/DL — SIGNIFICANT CHANGE UP (ref 8.4–10.5)
CHLORIDE SERPL-SCNC: 110 MMOL/L — HIGH (ref 96–108)
CO2 SERPL-SCNC: 22 MMOL/L — SIGNIFICANT CHANGE UP (ref 22–31)
CREAT SERPL-MCNC: 1.13 MG/DL — SIGNIFICANT CHANGE UP (ref 0.5–1.3)
EOSINOPHIL # BLD AUTO: 0.04 K/UL — SIGNIFICANT CHANGE UP (ref 0–0.5)
EOSINOPHIL NFR BLD AUTO: 0.4 % — SIGNIFICANT CHANGE UP (ref 0–6)
GLUCOSE SERPL-MCNC: 102 MG/DL — HIGH (ref 70–99)
HCT VFR BLD CALC: 33.2 % — LOW (ref 39–50)
HGB BLD-MCNC: 11.9 G/DL — LOW (ref 13–17)
IMM GRANULOCYTES NFR BLD AUTO: 0.3 % — SIGNIFICANT CHANGE UP (ref 0–1.5)
LIDOCAIN IGE QN: 2610 U/L — HIGH (ref 7–60)
LYMPHOCYTES # BLD AUTO: 0.78 K/UL — LOW (ref 1–3.3)
LYMPHOCYTES # BLD AUTO: 8.5 % — LOW (ref 13–44)
MCHC RBC-ENTMCNC: 30.9 PG — SIGNIFICANT CHANGE UP (ref 27–34)
MCHC RBC-ENTMCNC: 35.8 GM/DL — SIGNIFICANT CHANGE UP (ref 32–36)
MCV RBC AUTO: 86.2 FL — SIGNIFICANT CHANGE UP (ref 80–100)
MONOCYTES # BLD AUTO: 0.58 K/UL — SIGNIFICANT CHANGE UP (ref 0–0.9)
MONOCYTES NFR BLD AUTO: 6.3 % — SIGNIFICANT CHANGE UP (ref 2–14)
NEUTROPHILS # BLD AUTO: 7.72 K/UL — HIGH (ref 1.8–7.4)
NEUTROPHILS NFR BLD AUTO: 84.1 % — HIGH (ref 43–77)
NRBC # BLD: 0 /100 WBCS — SIGNIFICANT CHANGE UP (ref 0–0)
PLATELET # BLD AUTO: 221 K/UL — SIGNIFICANT CHANGE UP (ref 150–400)
POTASSIUM SERPL-MCNC: 4.5 MMOL/L — SIGNIFICANT CHANGE UP (ref 3.5–5.3)
POTASSIUM SERPL-SCNC: 4.5 MMOL/L — SIGNIFICANT CHANGE UP (ref 3.5–5.3)
PROT SERPL-MCNC: 5.9 G/DL — LOW (ref 6–8.3)
RBC # BLD: 3.85 M/UL — LOW (ref 4.2–5.8)
RBC # FLD: 14.3 % — SIGNIFICANT CHANGE UP (ref 10.3–14.5)
SODIUM SERPL-SCNC: 144 MMOL/L — SIGNIFICANT CHANGE UP (ref 135–145)
WBC # BLD: 9.19 K/UL — SIGNIFICANT CHANGE UP (ref 3.8–10.5)
WBC # FLD AUTO: 9.19 K/UL — SIGNIFICANT CHANGE UP (ref 3.8–10.5)

## 2020-02-04 PROCEDURE — 99221 1ST HOSP IP/OBS SF/LOW 40: CPT | Mod: GC,25

## 2020-02-04 PROCEDURE — 74328 X-RAY BILE DUCT ENDOSCOPY: CPT | Mod: 26,GC

## 2020-02-04 PROCEDURE — 99285 EMERGENCY DEPT VISIT HI MDM: CPT

## 2020-02-04 PROCEDURE — 43259 EGD US EXAM DUODENUM/JEJUNUM: CPT

## 2020-02-04 PROCEDURE — 43274 ERCP DUCT STENT PLACEMENT: CPT | Mod: GC

## 2020-02-04 RX ORDER — SODIUM CHLORIDE 9 MG/ML
1000 INJECTION, SOLUTION INTRAVENOUS
Refills: 0 | Status: DISCONTINUED | OUTPATIENT
Start: 2020-02-04 | End: 2020-02-05

## 2020-02-04 RX ORDER — SODIUM CHLORIDE 9 MG/ML
500 INJECTION, SOLUTION INTRAVENOUS ONCE
Refills: 0 | Status: COMPLETED | OUTPATIENT
Start: 2020-02-04 | End: 2020-02-04

## 2020-02-04 RX ORDER — HYDROMORPHONE HYDROCHLORIDE 2 MG/ML
1 INJECTION INTRAMUSCULAR; INTRAVENOUS; SUBCUTANEOUS ONCE
Refills: 0 | Status: DISCONTINUED | OUTPATIENT
Start: 2020-02-04 | End: 2020-02-04

## 2020-02-04 RX ORDER — SODIUM CHLORIDE 9 MG/ML
1000 INJECTION, SOLUTION INTRAVENOUS
Refills: 0 | Status: DISCONTINUED | OUTPATIENT
Start: 2020-02-04 | End: 2020-02-04

## 2020-02-04 RX ORDER — RIVAROXABAN 15 MG-20MG
20 KIT ORAL
Refills: 0 | Status: DISCONTINUED | OUTPATIENT
Start: 2020-02-05 | End: 2020-02-05

## 2020-02-04 RX ORDER — TAMSULOSIN HYDROCHLORIDE 0.4 MG/1
1 CAPSULE ORAL
Qty: 0 | Refills: 0 | DISCHARGE

## 2020-02-04 RX ORDER — FINASTERIDE 5 MG/1
1 TABLET, FILM COATED ORAL
Qty: 0 | Refills: 0 | DISCHARGE

## 2020-02-04 RX ORDER — METOPROLOL TARTRATE 50 MG
12.5 TABLET ORAL DAILY
Refills: 0 | Status: DISCONTINUED | OUTPATIENT
Start: 2020-02-05 | End: 2020-02-05

## 2020-02-04 RX ORDER — PANTOPRAZOLE SODIUM 20 MG/1
40 TABLET, DELAYED RELEASE ORAL
Refills: 0 | Status: DISCONTINUED | OUTPATIENT
Start: 2020-02-04 | End: 2020-02-05

## 2020-02-04 RX ADMIN — SODIUM CHLORIDE 125 MILLILITER(S): 9 INJECTION, SOLUTION INTRAVENOUS at 23:14

## 2020-02-04 RX ADMIN — SODIUM CHLORIDE 500 MILLILITER(S): 9 INJECTION, SOLUTION INTRAVENOUS at 21:04

## 2020-02-04 NOTE — H&P ADULT - PROBLEM SELECTOR PLAN 3
Pt with h/o Afib, on AC with Xarelto. Rate controlled with Toprol 12.5mg qd, currently asymptomatic, hemodynamically stable.   - Resume xarelto for now   - resume toprol 12.5

## 2020-02-04 NOTE — H&P ADULT - HISTORY OF PRESENT ILLNESS
84M with PMH A-fib on Xarelto, GERD, hx spinal fusion, hx cholecystectomy, presents with epigastric abdominal pain s/p EUS/ERCP with stent placement today. He states he first started having epigastric pain, which he describes as "abdominal attack" starting 1 week ago, that would be intermittent lasting from 30min to an hour. He presented today to the endoscopy suite for EUS which revealed choledocholithiasis and dilated CBD, for which he underwent ERCP with stent placement. After the procedure, he started to complain of constant non-radiating epigastric pain 9/10. He was given dilaudid which helped with the pain. ROS otherwise negative for fevers, chills, chest pain, shortness of breath, n/v/d, melena, hematochezia or any other complaints at this time.     In the ED:  Initial vital signs: T: 97.9 F, HR: 99, BP: 161/85 mmHg, R: 18, SpO2: 97% on RA  ED course: Dilaudid was given in the endoscopy suite, s/p LR bolus 500cc in the ED and started on maintenance 125/hr   Labs: significant for H/H 11.9/33.2, BMP unremarkable, with the exception of slightly elevated Cl, l  Imaging:  CXR:   EKG:   Medications:   Consults: none 84M with PMH A-fib on Xarelto, GERD, hx spinal fusion, hx cholecystectomy, presents with epigastric abdominal pain s/p EUS/ERCP with stent placement today. He states he first started having epigastric pain, which he describes as "abdominal attack" starting 1 week ago, that would be intermittent lasting from 30min to an hour. He presented today to the endoscopy suite for EUS which revealed choledocholithiasis and dilated CBD, for which he underwent ERCP with stent placement. After the procedure, he started to complain of constant non-radiating epigastric pain 9/10. He was given dilaudid which helped with the pain. ROS otherwise negative for fevers, chills, chest pain, shortness of breath, n/v/d, melena, hematochezia or any other complaints at this time.     In the ED:  Initial vital signs: T: 97.9 F, HR: 99, BP: 161/85 mmHg, R: 18, SpO2: 97% on RA  ED course: Dilaudid was given in the endoscopy suite, s/p LR bolus 500cc in the ED and started on maintenance 125/hr   Labs: significant for H/H 11.9/33.2, BMP unremarkable, with the exception of slightly elevated Cl, lipase elevated to 2610, LFTs otherwise normal, ALP elevated 150  Consults: Gastroenterology

## 2020-02-04 NOTE — H&P ADULT - NSHPPHYSICALEXAM_GEN_ALL_CORE
VITAL SIGNS:  T(C): 36.7 (02-04-20 @ 21:06), Max: 36.7 (02-04-20 @ 21:06)  T(F): 98.1 (02-04-20 @ 21:06), Max: 98.1 (02-04-20 @ 21:06)  HR: 55 (02-04-20 @ 21:06) (55 - 99)  BP: 134/66 (02-04-20 @ 21:06) (134/66 - 161/85)  BP(mean): --  RR: 18 (02-04-20 @ 21:06) (18 - 18)  SpO2: 97% (02-04-20 @ 21:06) (96% - 97%)  Wt(kg): --    PHYSICAL EXAM:    Constitutional: WDWN, lying comfortably in bed, NAD  Head: Nc/At  Eyes: PERRL, EOMI, clear conjunctiva  ENT: no nasal discharge; uvula midline, no oropharyngeal erythema or exudates; MMM  Neck: supple; no JVD or thyromegaly  Respiratory: CTA b/l, no wheezes, rales, or rhonchi  Cardiac: +S1/S2, +RRR, no murmurs, rubs, or gallops  Gastrointestinal: soft, non-tender, non-distended, no rebound/guarding, no palpable masses, normoactive bowel sounds x4  Back: spine midline, no bony tenderness or step-offs; no CVAT B/L  Extremities: WWP, no clubbing or cyanosis, no peripheral edema  Musculoskeletal: NROM x4; no joint swelling, tenderness or erythema  Vascular: 2+ radial and DP pulses b/l  Dermatologic: skin warm, dry and intact, no rashes, wounds, or scars  Lymphatic: no submandibular or cervical LAD  Neurologic: AAOx3, CNII-XII grossly intact, no focal deficits  Psychiatric: affect and characteristics of appearance, verbalizations, behaviors are appropriate VITAL SIGNS:  T(C): 36.7 (02-04-20 @ 21:06), Max: 36.7 (02-04-20 @ 21:06)  T(F): 98.1 (02-04-20 @ 21:06), Max: 98.1 (02-04-20 @ 21:06)  HR: 55 (02-04-20 @ 21:06) (55 - 99)  BP: 134/66 (02-04-20 @ 21:06) (134/66 - 161/85)  BP(mean): --  RR: 18 (02-04-20 @ 21:06) (18 - 18)  SpO2: 97% (02-04-20 @ 21:06) (96% - 97%)  Wt(kg): --    PHYSICAL EXAM:    Constitutional: WDWN, lying comfortably in bed, NAD  Head: Nc/At  Eyes: PERRL, EOMI, clear conjunctiva  ENT: no nasal discharge; uvula midline, no oropharyngeal erythema or exudates; MMM  Neck: supple; no JVD or thyromegaly  Respiratory: mild bibasilar crackles heard, no rhonchi, rales, wheezes, no evidence of respiratory distress   Cardiac: +S1/S2, +RRR, no murmurs, rubs, or gallops  Gastrointestinal: soft, non-tender, non-distended, no rebound/guarding, no palpable masses, normoactive bowel sounds x4  Back: spine midline, no bony tenderness or step-offs; no CVAT B/L  Extremities: WWP, no clubbing or cyanosis, no peripheral edema  Musculoskeletal: NROM x4; no joint swelling, tenderness or erythema  Vascular: 2+ radial and DP pulses b/l  Dermatologic: skin warm, dry and intact, no rashes, wounds, or scars  Lymphatic: no submandibular or cervical LAD  Neurologic: AAOx3, CNII-XII grossly intact, no focal deficits  Psychiatric: affect and characteristics of appearance, verbalizations, behaviors are appropriate

## 2020-02-04 NOTE — H&P ADULT - NSICDXPASTMEDICALHX_GEN_ALL_CORE_FT
PAST MEDICAL HISTORY:  Arthritis     Atrial fibrillation     Blood in urine Pt went to urologist last week and MD said pt had blood in urine; not visible to pt    BPH (benign prostatic hypertrophy)     Choledocholithiasis     Constipation     Spinal stenosis herniated disc

## 2020-02-04 NOTE — H&P ADULT - PROBLEM SELECTOR PLAN 1
Pt with epigastric pain post-procedurally after EUS/ERCP with stent placement, now resolved after receiving Dilaudid. Has not required any additional pain medication. s/p Dilaudid 1mg and LR bolus and fluid resuscitation. GI consulted in the ED, with low suspicion for post-ERCP pancreatitis, though lipase is elevated to 2150 (likely 2/2 manipulation). Pain now resolved   - GI consulted, f/u recs, will reassess in the AM  - keep NPO for now  - will likely need repeat ERCP in 3-4 weeks when off of anticoagulation  - continue IV fluids with LR until AM Pt with epigastric pain post-procedurally after EUS/ERCP with stent placement, now resolved after receiving Dilaudid. Has not required any additional pain medication. s/p Dilaudid 1mg and LR bolus and fluid resuscitation. GI consulted in the ED, with low suspicion for post-ERCP pancreatitis, though lipase is elevated to 2150 (likely 2/2 manipulation). Pain now resolved   - GI consulted, f/u recs, will reassess in the AM  - will likely need repeat ERCP in 3-4 weeks when off of anticoagulation  - continue IV fluids with LR until AM  - resume diet

## 2020-02-04 NOTE — CONSULT NOTE ADULT - ASSESSMENT
84M w/ PMHx of atrial fibrillation on xarelto, GERD, hx of spinal fusion, hx of cholecystectomy presents EUS/ERCP with stent placement referred to the ED for postprocedural pain.     #Postprocedural pain  Patient with EUS/ERCP 2/4/20 with subsequent abdominal pain.  Pain reported to be epigastric and similar to prior "gallstone" attack.  Patient denies radiation to back and noted improvement with opiate.  He was given LR during and post procedure.  Low suspicion for Post-ERCP pancreatitis, but would continue to monitor following procedure.  -Continue IVF if no contraindication  -Pain control and serial abdominal examination  -F/u on lab  -Will need repeat ERCp in 3-4 weeks off anticoagulation  -GI will continue to follow    Case d/w Irineo Rincon and Chepe

## 2020-02-04 NOTE — ED ADULT TRIAGE NOTE - CHIEF COMPLAINT QUOTE
pt sent down from outpatient US ERCP with stones in bile duct to be admitted, per RN, pt did not have stones removed because he took his xarelto this morning so stent remained in place in bile duct. pt awake and alert. pt last received propofol at 1720, then received 1g of tylenol for pain followed by 0.5mg dilaudid.

## 2020-02-04 NOTE — H&P ADULT - NSHPLABSRESULTS_GEN_ALL_CORE
11.9   9.19  )-----------( 221      ( 04 Feb 2020 21:06 )             33.2       02-04    144  |  110<H>  |  21  ----------------------------<  102<H>  4.5   |  22  |  1.13    Ca    9.0      04 Feb 2020 21:06    TPro  5.9<L>  /  Alb  3.7  /  TBili  0.8  /  DBili  x   /  AST  20  /  ALT  29  /  AlkPhos  150<H>  02-04                      Lactate Trend            CAPILLARY BLOOD GLUCOSE

## 2020-02-04 NOTE — ED ADULT NURSE NOTE - NSIMPLEMENTINTERV_GEN_ALL_ED
Implemented All Universal Safety Interventions:  [place] to call system. Call bell, personal items and telephone within reach. Instruct patient to call for assistance. Room bathroom lighting operational. Non-slip footwear when patient is off stretcher. Physically safe environment: no spills, clutter or unnecessary equipment. Stretcher in lowest position, wheels locked, appropriate side rails in place.

## 2020-02-04 NOTE — ED PROVIDER NOTE - OBJECTIVE STATEMENT
83 yo male h/o atrial fibrillation on xarelto, GERD, hx of spinal fusion, s/p cholecystectomy s/p EUS/ERCP with stent placement sent to ed for severe, persistent pain despite meds after procedure.  Pt now reports pain 1/10 from 9/10 after dilaudid.  No n/v/d.

## 2020-02-04 NOTE — H&P ADULT - NSICDXPASTSURGICALHX_GEN_ALL_CORE_FT
PAST SURGICAL HISTORY:  H/O hernia repair X 3 - last procedure was 10 years ago    History of hip replacement, total, left     History of tonsillectomy

## 2020-02-04 NOTE — H&P ADULT - NSICDXFAMILYHX_GEN_ALL_CORE_FT
FAMILY HISTORY:  Father  Still living? Unknown  Family history of cardiac disorder in father, Age at diagnosis: Age Unknown    Sibling  Still living? Unknown  Family history of cardiac disorder in father, Age at diagnosis: Age Unknown

## 2020-02-04 NOTE — H&P ADULT - ASSESSMENT
84M with A-fib on Xarelto, GERD, hx spinal fusion, hx cholecystectomy, recent diagnosis of choledocholithiasis , presents s/p ERCP w/ stent placement for transient episode of severe epigastric abd pain, admitted for pain control.

## 2020-02-04 NOTE — ED PROVIDER NOTE - CLINICAL SUMMARY MEDICAL DECISION MAKING FREE TEXT BOX
Pt sent for admit for pain mgmt and monitoring s/p egd/ercp, stent placement.  GI following.  Pain improved w iv meds.

## 2020-02-04 NOTE — CONSULT NOTE ADULT - SUBJECTIVE AND OBJECTIVE BOX
Dr. De Jesus notified of patient's continued pain but unable to give iv pain med due to decreased bp.  Orders received   HPI: 84M w/ PMHx of atrial fibrillation on xarelto, GERD, hx of spinal fusion, hx of cholecystectomy presents EUS/ERCP with stent placement referred to the ED for postprocedural pain.  Patient reports last episode of abdominal attack with epigastric pain similar to prior gallstone attack was around 1 week ago and lasts 30minutes to 1 hour.  He presents to endoscopy suite today for EUS to r/o choledocholithiasis in setting of abdominal pain.  EUS was notable for cholelithiasis with dilated CBD so he had ERCp with stent placement.  Following procedure, patient reports return of his prior abdominal pain around 9/10, nonradiating, and constant.  Denies fever, chill, cp, sob, abd pain, nausea, vomiting, and diarrhea.  Patient reports improvement in pain following dilaudid from 9/10 to 7/10.      Allergies    No Known Allergies    Intolerances      Home Medications:  docusate sodium 100 mg oral capsule: 1 cap(s) orally 3 times a day (06 Apr 2017 10:28)  finasteride 5 mg oral tablet: 1 tab(s) orally once a day (04 Apr 2017 09:15)  magnesium hydroxide 8% oral suspension: 30 milliliter(s) orally once a day, As needed, Constipation (06 Apr 2017 10:28)  metoprolol: 12.5 milligram(s) orally once a day (04 Apr 2017 09:15)  omeprazole 40 mg oral delayed release capsule: 1 cap(s) orally once a day (04 Apr 2017 09:15)  tamsulosin 0.4 mg oral capsule: 1 cap(s) orally once a day (04 Apr 2017 09:15)  Xarelto 20 mg oral tablet: 1 tab(s) orally once a day (in the evening) (04 Apr 2017 09:15)    MEDICATIONS:  MEDICATIONS  (STANDING):    MEDICATIONS  (PRN):    PAST MEDICAL & SURGICAL HISTORY:  BPH (benign prostatic hypertrophy)  Spinal stenosis: herniated disc  Arthritis  Blood in urine: Pt went to urologist last week and MD said pt had blood in urine; not visible to pt  Constipation  Atrial fibrillation  History of tonsillectomy  History of hip replacement, total, left  H/O hernia repair: X 3 - last procedure was 10 years ago    FAMILY HISTORY:  Family history of cardiac disorder in father (Father, Sibling)    SOCIAL HISTORY:  Tobacco: [ ] Current, [ ] Former, [ ] Never; Pack Years:  Alcohol:  Illicit Drugs:    REVIEW OF SYSTEMS:  All other 10 review of systems is negative except as indicated in HPI    Vital Signs Last 24 Hrs  T(C): 36.6 (04 Feb 2020 19:02), Max: 36.6 (04 Feb 2020 19:02)  T(F): 97.9 (04 Feb 2020 19:02), Max: 97.9 (04 Feb 2020 19:02)  HR: 99 (04 Feb 2020 19:02) (99 - 99)  BP: 161/85 (04 Feb 2020 19:02) (161/85 - 161/85)  BP(mean): --  RR: 18 (04 Feb 2020 19:02) (18 - 18)  SpO2: 96% (04 Feb 2020 19:02) (96% - 96%)      PHYSICAL EXAM:    General: Well developed; well nourished; in no acute distress  Eyes: moist conjunctivae, sclerae anicteric  HENT: Moist mucous membranes, tongue midline  Neck: Trachea midline, supple  Lungs: Normal respiratory effort and no intercostal retractions  Cardiovascular: RRR, S1S2  Abdomen: Soft, minimally tender at the epigastrum, non-distended; Normal bowel sounds; No rebound or guarding  Extremities: Normal range of motion, No clubbing, cyanosis or edema  Neurological: Alert and oriented x3, nonfocal exam  Skin: Warm and dry. No obvious rash    LABS: no new lab      RADIOLOGY & ADDITIONAL STUDIES:   no new images HPI: 84M w/ PMHx of atrial fibrillation on xarelto, GERD, hx of spinal fusion, hx of cholecystectomy presents EUS/ERCP with stent placement referred to the ED for postprocedural pain.  Patient reports last episode of "abdominal attack" with epigastric pain similar to prior gallstone attack was around 1 week ago and lasts 30minutes to 1 hour.  He presents to endoscopy suite today for EUS to r/o choledocholithiasis in setting of abdominal pain.  EUS was notable for choledocholithiasis with dilated CBD so procedure proceeded with an ERCP with stent placement.  Following procedure, patient reports return of his prior abdominal pain around 9/10, nonradiating, and constant.  Denies fever, chill, cp, sob, abd pain, nausea, vomiting, and diarrhea.  Patient reports improvement in pain following dilaudid from 9/10 to 7/10.      Allergies    No Known Allergies    Intolerances      Home Medications:  docusate sodium 100 mg oral capsule: 1 cap(s) orally 3 times a day (06 Apr 2017 10:28)  finasteride 5 mg oral tablet: 1 tab(s) orally once a day (04 Apr 2017 09:15)  magnesium hydroxide 8% oral suspension: 30 milliliter(s) orally once a day, As needed, Constipation (06 Apr 2017 10:28)  metoprolol: 12.5 milligram(s) orally once a day (04 Apr 2017 09:15)  omeprazole 40 mg oral delayed release capsule: 1 cap(s) orally once a day (04 Apr 2017 09:15)  tamsulosin 0.4 mg oral capsule: 1 cap(s) orally once a day (04 Apr 2017 09:15)  Xarelto 20 mg oral tablet: 1 tab(s) orally once a day (in the evening) (04 Apr 2017 09:15)    MEDICATIONS:  MEDICATIONS  (STANDING):    MEDICATIONS  (PRN):    PAST MEDICAL & SURGICAL HISTORY:  BPH (benign prostatic hypertrophy)  Spinal stenosis: herniated disc  Arthritis  Blood in urine: Pt went to urologist last week and MD said pt had blood in urine; not visible to pt  Constipation  Atrial fibrillation  History of tonsillectomy  History of hip replacement, total, left  H/O hernia repair: X 3 - last procedure was 10 years ago    FAMILY HISTORY:  Family history of cardiac disorder in father (Father, Sibling)    SOCIAL HISTORY:  Tobacco: [ ] Current, [ ] Former, [ ] Never; Pack Years:  Alcohol:  Illicit Drugs:    REVIEW OF SYSTEMS:  All other 10 review of systems is negative except as indicated in HPI    Vital Signs Last 24 Hrs  T(C): 36.6 (04 Feb 2020 19:02), Max: 36.6 (04 Feb 2020 19:02)  T(F): 97.9 (04 Feb 2020 19:02), Max: 97.9 (04 Feb 2020 19:02)  HR: 99 (04 Feb 2020 19:02) (99 - 99)  BP: 161/85 (04 Feb 2020 19:02) (161/85 - 161/85)  BP(mean): --  RR: 18 (04 Feb 2020 19:02) (18 - 18)  SpO2: 96% (04 Feb 2020 19:02) (96% - 96%)      PHYSICAL EXAM:    General: Well developed; well nourished; in no acute distress  Eyes: moist conjunctivae, sclerae anicteric  HENT: Moist mucous membranes, tongue midline  Neck: Trachea midline, supple  Lungs: Normal respiratory effort and no intercostal retractions  Cardiovascular: RRR, S1S2  Abdomen: Soft, minimally tender at the epigastrum, non-distended; Normal bowel sounds; No rebound or guarding  Extremities: Normal range of motion, No clubbing, cyanosis or edema  Neurological: Alert and oriented x3, nonfocal exam  Skin: Warm and dry. No obvious rash    LABS: no new lab      RADIOLOGY & ADDITIONAL STUDIES:   no new images HPI: 84M w/ PMHx of atrial fibrillation on xarelto, GERD, hx of spinal fusion, hx of cholecystectomy presents EUS/ERCP with stent placement referred to the ED for postprocedural pain.  Patient reports last episode of "abdominal attack" with epigastric pain similar to prior gallstone attack was around 1 week ago and lasts 30minutes to 1 hour.  He presents to endoscopy suite today for EUS to r/o choledocholithiasis in setting of abdominal pain.  EUS was notable for choledocholithiasis with dilated CBD so procedure proceeded with an ERCP with stent placement.  Following procedure, patient reports return of his prior abdominal pain around 9/10, nonradiating, and constant.  Denies fever, chill, cp, sob, abd pain, nausea, vomiting, and diarrhea.  Patient reports improvement in pain following dilaudid from 9/10 to 7/10.      Allergies    No Known Allergies    Intolerances      Home Medications:  docusate sodium 100 mg oral capsule: 1 cap(s) orally 3 times a day (06 Apr 2017 10:28)  finasteride 5 mg oral tablet: 1 tab(s) orally once a day (04 Apr 2017 09:15)  magnesium hydroxide 8% oral suspension: 30 milliliter(s) orally once a day, As needed, Constipation (06 Apr 2017 10:28)  metoprolol: 12.5 milligram(s) orally once a day (04 Apr 2017 09:15)  omeprazole 40 mg oral delayed release capsule: 1 cap(s) orally once a day (04 Apr 2017 09:15)  tamsulosin 0.4 mg oral capsule: 1 cap(s) orally once a day (04 Apr 2017 09:15)  Xarelto 20 mg oral tablet: 1 tab(s) orally once a day (in the evening) (04 Apr 2017 09:15)    MEDICATIONS:  MEDICATIONS  (STANDING):    MEDICATIONS  (PRN):    PAST MEDICAL & SURGICAL HISTORY:  BPH (benign prostatic hypertrophy)  Spinal stenosis: herniated disc  Arthritis  Blood in urine: Pt went to urologist last week and MD said pt had blood in urine; not visible to pt  Constipation  Atrial fibrillation  History of tonsillectomy  History of hip replacement, total, left  H/O hernia repair: X 3 - last procedure was 10 years ago    FAMILY HISTORY:  Family history of cardiac disorder in father (Father, Sibling)    SOCIAL HISTORY:  previous smoker, Vegeterian currently    REVIEW OF SYSTEMS:  All other 10 review of systems is negative except as indicated in HPI    Vital Signs Last 24 Hrs  T(C): 36.6 (04 Feb 2020 19:02), Max: 36.6 (04 Feb 2020 19:02)  T(F): 97.9 (04 Feb 2020 19:02), Max: 97.9 (04 Feb 2020 19:02)  HR: 99 (04 Feb 2020 19:02) (99 - 99)  BP: 161/85 (04 Feb 2020 19:02) (161/85 - 161/85)  BP(mean): --  RR: 18 (04 Feb 2020 19:02) (18 - 18)  SpO2: 96% (04 Feb 2020 19:02) (96% - 96%)      PHYSICAL EXAM:    General: Well developed; well nourished; in no acute distress  Eyes: moist conjunctivae, sclerae anicteric  HENT: Moist mucous membranes, tongue midline  Neck: Trachea midline, supple  Lungs: Normal respiratory effort and no intercostal retractions  Cardiovascular: RRR, S1S2  Abdomen: Soft, minimally tender at the epigastrum, non-distended; Normal bowel sounds; No rebound or guarding  Extremities: Normal range of motion, No clubbing, cyanosis or edema  Neurological: Alert and oriented x3, nonfocal exam  Skin: Warm and dry. No obvious rash    LABS: no new lab      RADIOLOGY & ADDITIONAL STUDIES:   no new images

## 2020-02-04 NOTE — ED ADULT NURSE NOTE - OBJECTIVE STATEMENT
pt arrives to ed s/p endoscopy/ercp. pt dx choledocholithiasis with biliary stent placement today. pt reports having multiple stones that were unable to be removed r/t taking his xarelto this am. pt given IV TYLENOL and IV Dilaudid prior to ed arrival. reports persistent abd pain with radiation to b/l flanks. no n/v/d. no cp, no sob. pos urine output post procedure. iv placed prior to ed arrival.

## 2020-02-04 NOTE — H&P ADULT - PROBLEM SELECTOR PLAN 4
Fluids: s/p 500cc LR, will continue /hr until AM  Electrolytes-replete as needed  Nutrition - NPO  Code- Full Code  DVT ppx: on Xarelto  GI ppx: none needed  DIspo: [facility] Fluids: s/p 500cc LR, will continue /hr until AM  Electrolytes-replete as needed  Nutrition - will restart diet, DASH/TLC diet  Code- Full Code  DVT ppx: on Xarelto  GI ppx: none needed  DIspo: [facility]

## 2020-02-04 NOTE — H&P ADULT - NSHPSOCIALHISTORY_GEN_ALL_CORE
.  Tobacco use:   EtOH use:  Illicit drug use:    Living situation: .  Tobacco use: denies   EtOH use: denies   Illicit drug use: denies

## 2020-02-05 ENCOUNTER — TRANSCRIPTION ENCOUNTER (OUTPATIENT)
Age: 85
End: 2020-02-05

## 2020-02-05 VITALS
RESPIRATION RATE: 16 BRPM | DIASTOLIC BLOOD PRESSURE: 62 MMHG | OXYGEN SATURATION: 98 % | HEART RATE: 50 BPM | TEMPERATURE: 98 F | SYSTOLIC BLOOD PRESSURE: 124 MMHG

## 2020-02-05 LAB
ALBUMIN SERPL ELPH-MCNC: 3.4 G/DL — SIGNIFICANT CHANGE UP (ref 3.3–5)
ALP SERPL-CCNC: 137 U/L — HIGH (ref 40–120)
ALT FLD-CCNC: 24 U/L — SIGNIFICANT CHANGE UP (ref 10–45)
ANION GAP SERPL CALC-SCNC: 10 MMOL/L — SIGNIFICANT CHANGE UP (ref 5–17)
AST SERPL-CCNC: 20 U/L — SIGNIFICANT CHANGE UP (ref 10–40)
BASOPHILS # BLD AUTO: 0.03 K/UL — SIGNIFICANT CHANGE UP (ref 0–0.2)
BASOPHILS NFR BLD AUTO: 0.3 % — SIGNIFICANT CHANGE UP (ref 0–2)
BILIRUB SERPL-MCNC: 1 MG/DL — SIGNIFICANT CHANGE UP (ref 0.2–1.2)
BUN SERPL-MCNC: 17 MG/DL — SIGNIFICANT CHANGE UP (ref 7–23)
CALCIUM SERPL-MCNC: 9 MG/DL — SIGNIFICANT CHANGE UP (ref 8.4–10.5)
CHLORIDE SERPL-SCNC: 109 MMOL/L — HIGH (ref 96–108)
CO2 SERPL-SCNC: 23 MMOL/L — SIGNIFICANT CHANGE UP (ref 22–31)
CREAT SERPL-MCNC: 1.14 MG/DL — SIGNIFICANT CHANGE UP (ref 0.5–1.3)
EOSINOPHIL # BLD AUTO: 0.1 K/UL — SIGNIFICANT CHANGE UP (ref 0–0.5)
EOSINOPHIL NFR BLD AUTO: 1 % — SIGNIFICANT CHANGE UP (ref 0–6)
GLUCOSE SERPL-MCNC: 98 MG/DL — SIGNIFICANT CHANGE UP (ref 70–99)
HCT VFR BLD CALC: 32.4 % — LOW (ref 39–50)
HGB BLD-MCNC: 11.4 G/DL — LOW (ref 13–17)
IMM GRANULOCYTES NFR BLD AUTO: 0.4 % — SIGNIFICANT CHANGE UP (ref 0–1.5)
LYMPHOCYTES # BLD AUTO: 0.94 K/UL — LOW (ref 1–3.3)
LYMPHOCYTES # BLD AUTO: 9.8 % — LOW (ref 13–44)
MAGNESIUM SERPL-MCNC: 1.9 MG/DL — SIGNIFICANT CHANGE UP (ref 1.6–2.6)
MCHC RBC-ENTMCNC: 31 PG — SIGNIFICANT CHANGE UP (ref 27–34)
MCHC RBC-ENTMCNC: 35.2 GM/DL — SIGNIFICANT CHANGE UP (ref 32–36)
MCV RBC AUTO: 88 FL — SIGNIFICANT CHANGE UP (ref 80–100)
MONOCYTES # BLD AUTO: 0.59 K/UL — SIGNIFICANT CHANGE UP (ref 0–0.9)
MONOCYTES NFR BLD AUTO: 6.1 % — SIGNIFICANT CHANGE UP (ref 2–14)
NEUTROPHILS # BLD AUTO: 7.93 K/UL — HIGH (ref 1.8–7.4)
NEUTROPHILS NFR BLD AUTO: 82.4 % — HIGH (ref 43–77)
NRBC # BLD: 0 /100 WBCS — SIGNIFICANT CHANGE UP (ref 0–0)
PHOSPHATE SERPL-MCNC: 2.7 MG/DL — SIGNIFICANT CHANGE UP (ref 2.5–4.5)
PLATELET # BLD AUTO: 213 K/UL — SIGNIFICANT CHANGE UP (ref 150–400)
POTASSIUM SERPL-MCNC: 4.2 MMOL/L — SIGNIFICANT CHANGE UP (ref 3.5–5.3)
POTASSIUM SERPL-SCNC: 4.2 MMOL/L — SIGNIFICANT CHANGE UP (ref 3.5–5.3)
PROT SERPL-MCNC: 5.4 G/DL — LOW (ref 6–8.3)
RBC # BLD: 3.68 M/UL — LOW (ref 4.2–5.8)
RBC # FLD: 14.1 % — SIGNIFICANT CHANGE UP (ref 10.3–14.5)
SODIUM SERPL-SCNC: 142 MMOL/L — SIGNIFICANT CHANGE UP (ref 135–145)
WBC # BLD: 9.63 K/UL — SIGNIFICANT CHANGE UP (ref 3.8–10.5)
WBC # FLD AUTO: 9.63 K/UL — SIGNIFICANT CHANGE UP (ref 3.8–10.5)

## 2020-02-05 PROCEDURE — 99222 1ST HOSP IP/OBS MODERATE 55: CPT

## 2020-02-05 PROCEDURE — 83690 ASSAY OF LIPASE: CPT

## 2020-02-05 PROCEDURE — 99232 SBSQ HOSP IP/OBS MODERATE 35: CPT | Mod: GC

## 2020-02-05 PROCEDURE — C1769: CPT

## 2020-02-05 PROCEDURE — 84100 ASSAY OF PHOSPHORUS: CPT

## 2020-02-05 PROCEDURE — 74330 X-RAY BILE/PANC ENDOSCOPY: CPT

## 2020-02-05 PROCEDURE — 83735 ASSAY OF MAGNESIUM: CPT

## 2020-02-05 PROCEDURE — C2617: CPT

## 2020-02-05 PROCEDURE — 85025 COMPLETE CBC W/AUTO DIFF WBC: CPT

## 2020-02-05 PROCEDURE — 36415 COLL VENOUS BLD VENIPUNCTURE: CPT

## 2020-02-05 PROCEDURE — 80053 COMPREHEN METABOLIC PANEL: CPT

## 2020-02-05 PROCEDURE — 99285 EMERGENCY DEPT VISIT HI MDM: CPT

## 2020-02-05 PROCEDURE — 43274 ERCP DUCT STENT PLACEMENT: CPT

## 2020-02-05 RX ORDER — ACETAMINOPHEN 500 MG
650 TABLET ORAL EVERY 6 HOURS
Refills: 0 | Status: DISCONTINUED | OUTPATIENT
Start: 2020-02-05 | End: 2020-02-05

## 2020-02-05 RX ORDER — ACETAMINOPHEN 500 MG
650 TABLET ORAL ONCE
Refills: 0 | Status: COMPLETED | OUTPATIENT
Start: 2020-02-05 | End: 2020-02-05

## 2020-02-05 RX ORDER — ACETAMINOPHEN 500 MG
2 TABLET ORAL
Qty: 56 | Refills: 0
Start: 2020-02-05 | End: 2020-02-11

## 2020-02-05 RX ORDER — SODIUM CHLORIDE 9 MG/ML
1000 INJECTION, SOLUTION INTRAVENOUS
Refills: 0 | Status: DISCONTINUED | OUTPATIENT
Start: 2020-02-05 | End: 2020-02-05

## 2020-02-05 RX ADMIN — PANTOPRAZOLE SODIUM 40 MILLIGRAM(S): 20 TABLET, DELAYED RELEASE ORAL at 07:27

## 2020-02-05 RX ADMIN — SODIUM CHLORIDE 110 MILLILITER(S): 9 INJECTION, SOLUTION INTRAVENOUS at 13:25

## 2020-02-05 RX ADMIN — RIVAROXABAN 20 MILLIGRAM(S): KIT at 12:01

## 2020-02-05 RX ADMIN — Medication 650 MILLIGRAM(S): at 12:02

## 2020-02-05 RX ADMIN — Medication 12.5 MILLIGRAM(S): at 06:48

## 2020-02-05 RX ADMIN — SODIUM CHLORIDE 100 MILLILITER(S): 9 INJECTION, SOLUTION INTRAVENOUS at 00:15

## 2020-02-05 NOTE — DISCHARGE NOTE PROVIDER - NSDCCPCAREPLAN_GEN_ALL_CORE_FT
PRINCIPAL DISCHARGE DIAGNOSIS  Diagnosis: Acute pancreatitis after endoscopic retrograde cholangiopancreatography (ERCP)  Assessment and Plan of Treatment: You were found to have inflamation of your pancreas secondary to your ERCP procedure, we provided aggressive fluid hydration to treat your pancreatitis. Please followup with your gastroenterologist within 1-3 days. PRINCIPAL DISCHARGE DIAGNOSIS  Diagnosis: Acute pancreatitis after endoscopic retrograde cholangiopancreatography (ERCP)  Assessment and Plan of Treatment: You were found to have inflamation of your pancreas secondary to your ERCP procedure, we provided aggressive fluid hydration to treat your pancreatitis. Please followup with your gastroenterologist within one week. Please eat a low fat diet to reduce pancreatic inflammation and accelerate healing.

## 2020-02-05 NOTE — DISCHARGE NOTE PROVIDER - NSDCMRMEDTOKEN_GEN_ALL_CORE_FT
docusate sodium 100 mg oral capsule: 1 cap(s) orally 3 times a day  metoprolol: 12.5 milligram(s) orally once a day  omeprazole 40 mg oral delayed release capsule: 1 cap(s) orally once a day  Tylenol Regular Strength 325 mg oral tablet: 2 tab(s) orally every 6 hours   Xarelto 20 mg oral tablet: 1 tab(s) orally once a day (in the evening)

## 2020-02-05 NOTE — DISCHARGE NOTE NURSING/CASE MANAGEMENT/SOCIAL WORK - NSDCPEXARELTO_GEN_ALL_CORE
Rivaroxaban/Xarelto - Compliance/Rivaroxaban/Xarelto - Potential for adverse drug reactions and interactions/Rivaroxaban/Xarelto - Dietary Advice/Rivaroxaban/Xarelto - Follow up monitoring

## 2020-02-05 NOTE — DISCHARGE NOTE PROVIDER - HOSPITAL COURSE
84M with A-fib on Xarelto, GERD, hx spinal fusion, hx cholecystectomy, recent diagnosis of choledocholithiasis , presents s/p ERCP w/ stent placement for transient episode of severe epigastric abd pain, admitted for management of post ERCP pancreatitis         Main Diagnoses/Hospital course:        #Post-ERCP pancreatitis- Pt with epigastric pain post-procedurally after EUS/ERCP with stent placement, now resolved after receiving Dilaudid. Has not required any additional pain medication. s/p Dilaudid 1mg and LR bolus and fluid resuscitation.    - Alk phos and lipase markedly elevated    - GI consulted, PO diet challenge and LR IVF    - will likely need repeat ERCP in 3-4 weeks when off of anticoagulation    - d/c on tylenol 650mg PRN q6h for epigastric pain         #Choledocholithiasis- see plan as above        Atrial fibrillation.  Plan: Pt with h/o Afib, on AC with Xarelto. Rate controlled with Toprol 12.5mg qd, currently asymptomatic, hemodynamically stable.     - Resume xarelto for now     - resume toprol 12.5        New meds: tylenol 650mg PRN q6h    New labs to followup: none    New exams to followup: none 84M with A-fib on Xarelto, GERD, hx spinal fusion, hx cholecystectomy, recent diagnosis of choledocholithiasis , presents s/p ERCP w/ stent placement for transient episode of severe epigastric abd pain, admitted for management of post ERCP pancreatitis         Main Diagnoses/Hospital course:        #Post-ERCP pancreatitis- Pt with epigastric pain post-procedurally after EUS/ERCP with stent placement, now resolved after receiving Dilaudid. Has not required any additional pain medication. s/p Dilaudid 1mg and LR bolus and fluid resuscitation.    - Alk phos and lipase markedly elevated    - GI consulted, PO diet challenge and LR IVF    - will likely need repeat ERCP in 3-4 weeks when off of anticoagulation    - recommended low fat diet     - d/c on tylenol 650mg PRN q6h for epigastric pain         #Choledocholithiasis- see plan as above        Atrial fibrillation.  Plan: Pt with h/o Afib, on AC with Xarelto. Rate controlled with Toprol 12.5mg qd, currently asymptomatic, hemodynamically stable.     - Resume xarelto for now     - resume toprol 12.5        New meds: tylenol 650mg PRN q6h    New labs to followup: none    New exams to followup: none

## 2020-02-05 NOTE — PROGRESS NOTE ADULT - ASSESSMENT
84M with A-fib on Xarelto, GERD, hx spinal fusion, hx cholecystectomy, recent diagnosis of choledocholithiasis , presents s/p ERCP w/ stent placement for transient episode of severe epigastric abd pain, admitted for pain control.    Problem/Plan - 1:  ·  Problem: Abdominal pain following cholangiogram.    - GI eval this morning   - trend lipase  - continue IV fluids with LR until AM  - resume diet assuming no repeat procedure with GI today    Problem/Plan - 2:  ·  Problem: Choledocholithiasis.  Plan: see plan as above.     Problem/Plan - 3:  ·  Problem: Atrial fibrillation.   - Resume xarelto   - resume toprol 12.5.

## 2020-02-05 NOTE — PROGRESS NOTE ADULT - SUBJECTIVE AND OBJECTIVE BOX
Pt seen and examined at bedside.  Patient upset being in the ED overnight.  Pain otherwise improving from 9/10 to 2/10 and no further medication requirement this morning.  Patient received 1.5L of LR in the ED.  Patient reports feeling hungry and was eating oatmeal without any issue.  Denies fever, chill, chest pain, shortness of breath, nausea, vomiting, hematemesis, diarrhea, constipation, melena, or hematochezia.  Patient requested provider to discuss with daughter regarding update in hospitalization, outreach call made and dtr was updated of hospital course.    Allergies    No Known Allergies    Intolerances      MEDICATIONS:  MEDICATIONS  (STANDING):  lactated ringers. 1000 milliLiter(s) (100 mL/Hr) IV Continuous <Continuous>  lactated ringers. 1000 milliLiter(s) (110 mL/Hr) IV Continuous <Continuous>  metoprolol succinate ER 12.5 milliGRAM(s) Oral daily  pantoprazole    Tablet 40 milliGRAM(s) Oral before breakfast  rivaroxaban 20 milliGRAM(s) Oral <User Schedule>    MEDICATIONS  (PRN):    Vital Signs Last 24 Hrs  T(C): 36.7 (05 Feb 2020 09:45), Max: 37.1 (04 Feb 2020 23:47)  T(F): 98 (05 Feb 2020 09:45), Max: 98.7 (04 Feb 2020 23:47)  HR: 63 (05 Feb 2020 09:45) (55 - 99)  BP: 141/66 (05 Feb 2020 09:45) (134/66 - 161/85)  BP(mean): --  RR: 18 (05 Feb 2020 09:45) (18 - 18)  SpO2: 97% (05 Feb 2020 09:45) (96% - 97%)    02-04 @ 07:01  -  02-05 @ 07:00  --------------------------------------------------------  IN: 0 mL / OUT: 300 mL / NET: -300 mL      PHYSICAL EXAM:    General: Well developed; well nourished; in no acute distress  HEENT: MMM, conjunctiva and sclera clear  Gastrointestinal: Soft non-tender non-distended; Normal bowel sounds; No hepatosplenomegaly. No rebound or guarding  Skin: Warm and dry. No obvious rash    LABS:                        11.4   9.63  )-----------( 213      ( 05 Feb 2020 05:55 )             32.4     02-05    142  |  109<H>  |  17  ----------------------------<  98  4.2   |  23  |  1.14    Ca    9.0      05 Feb 2020 05:55  Phos  2.7     02-05  Mg     1.9     02-05    TPro  5.4<L>  /  Alb  3.4  /  TBili  1.0  /  DBili  x   /  AST  20  /  ALT  24  /  AlkPhos  137<H>  02-05

## 2020-02-05 NOTE — PROGRESS NOTE ADULT - SUBJECTIVE AND OBJECTIVE BOX
INTERVAL HPI/OVERNIGHT EVENTS:  Pt in the Er overnight.  Pain currently 2-3/10.  no nausea.     MEDICATIONS  (STANDING):  lactated ringers. 1000 milliLiter(s) (100 mL/Hr) IV Continuous <Continuous>  metoprolol succinate ER 12.5 milliGRAM(s) Oral daily  pantoprazole    Tablet 40 milliGRAM(s) Oral before breakfast  rivaroxaban 20 milliGRAM(s) Oral <User Schedule>    Allergies  No Known Allergies          REVIEW OF SYSTEMS:  CONSTITUTIONAL:  No night sweats.  positive fatigue,  No fever or chills.  HEENT:  Eyes:  No visual changes.  n.  RESPIRATORY:  No cough.  No wheeze.    No shortness of breath.  CARDIOVASCULAR:  No chest pains.  No palpitations.  GASTROINTESTINAL:  abdominal pain as above.  No nausea or vomiting.  No diarrhea or constipation.    GENITOURINARY:  No urgency.  No frequency.  No dysuria.  No hematuria.    MUSCULOSKELETAL:  No musculoskeletal pain.  No joint swelling.    SKIN:  No rashes.  No lesions.  No wounds.  HEMATOLOGIC:  No purpura.        T(C): 36.9 (02-05-20 @ 04:51), Max: 37.1 (02-04-20 @ 23:47)  HR: 56 (02-05-20 @ 04:51) (55 - 99)  BP: 147/72 (02-05-20 @ 04:51) (134/66 - 161/85)  RR: 18 (02-05-20 @ 04:51) (18 - 18)  SpO2: 97% (02-05-20 @ 04:51) (96% - 97%)  Wt(kg): --    PHYSICAL EXAM:   General - NAD, Alert and oriented x 3,   Eyes - PERRLA, EOM intact  Neck - - No lymphadenopathy  Cardiovascular - RRR no m/r/g, no JVD  Lungs - Clear to auscltation, no use of acessory muscles, no crackles or wheezes.  Skin - No rashes, skin warm and dry, no erythematous areas  Abdomen - Normal bowel sounds, abdomen soft and nontender  Extremeties - No edema, cyanosis or clubbing  Neurological – no focal deficits    LABS:                        11.4   9.63  )-----------( 213      ( 05 Feb 2020 05:55 )             32.4     02-05    142  |  109<H>  |  17  ----------------------------<  98  4.2   |  23  |  1.14    Ca    9.0      05 Feb 2020 05:55  Phos  2.7     02-05  Mg     1.9     02-05    TPro  5.4<L>  /  Alb  3.4  /  TBili  1.0  /  DBili  x   /  AST  20  /  ALT  24  /  AlkPhos  137<H>  02-05          RADIOLOGY & ADDITIONAL TESTS:

## 2020-02-05 NOTE — DISCHARGE NOTE PROVIDER - CARE PROVIDER_API CALL
Segun Rincon (MD)  Gastroenterology; Internal Medicine  [address] [place] [address]  Phone: [phone]  Fax: [phone]  Established Patient  Follow Up Time: 1-3 days

## 2020-02-05 NOTE — DISCHARGE NOTE NURSING/CASE MANAGEMENT/SOCIAL WORK - PATIENT PORTAL LINK FT
You can access the FollowMyHealth Patient Portal offered by [facility] by registering at the following website: [email or web address]. By joining [email or web address]’s FollowMyHealth portal, you will also be able to view your health information using other applications (apps) compatible with our system.

## 2020-02-05 NOTE — PROGRESS NOTE ADULT - ASSESSMENT
84M w/ PMHx of atrial fibrillation on xarelto, GERD, hx of spinal fusion, hx of cholecystectomy presents EUS/ERCP with stent placement referred to the ED for postprocedural pain found to have post-ERCP pancreatitis.     #post-ERCP pancreatitis, BISAP 1 (d/t age and no imaging of lung but no evidence of pleural effusion)  Patient with EUS/ERCP 2/4/20 with subsequent abdominal pain.  Pain reported to be epigastric and similar to prior "gallstone" attack.  Patient denies radiation to back and noted improvement with opiate.  He was given LR during and post procedure.  Initial low suspicion given minimal manipulation during procedure; however, elevated lipase with abd pain c/w pancreatitis.  Continue IVF and pain control with early enteral nutrition.  -Low fat diet  -Continue IVF if no contraindication  -Pain control and serial abdominal examination  -Repeat ERCP in 3-4 weeks off anticoagulation, office will reach out to patient for scheduling  -GI will continue to follow    Patient seen and examined with Dr. Rincon

## 2020-02-12 DIAGNOSIS — Z96.642 PRESENCE OF LEFT ARTIFICIAL HIP JOINT: ICD-10-CM

## 2020-02-12 DIAGNOSIS — K91.89 OTHER POSTPROCEDURAL COMPLICATIONS AND DISORDERS OF DIGESTIVE SYSTEM: ICD-10-CM

## 2020-02-12 DIAGNOSIS — N40.0 BENIGN PROSTATIC HYPERPLASIA WITHOUT LOWER URINARY TRACT SYMPTOMS: ICD-10-CM

## 2020-02-12 DIAGNOSIS — R74.8 ABNORMAL LEVELS OF OTHER SERUM ENZYMES: ICD-10-CM

## 2020-02-12 DIAGNOSIS — K85.90 ACUTE PANCREATITIS WITHOUT NECROSIS OR INFECTION, UNSPECIFIED: ICD-10-CM

## 2020-02-12 DIAGNOSIS — I48.91 UNSPECIFIED ATRIAL FIBRILLATION: ICD-10-CM

## 2020-02-12 DIAGNOSIS — K59.00 CONSTIPATION, UNSPECIFIED: ICD-10-CM
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
## 2020-02-12 DIAGNOSIS — Z79.01 LONG TERM (CURRENT) USE OF ANTICOAGULANTS: ICD-10-CM

## 2020-02-12 DIAGNOSIS — Z90.49 ACQUIRED ABSENCE OF OTHER SPECIFIED PARTS OF DIGESTIVE TRACT: ICD-10-CM

## 2020-02-12 DIAGNOSIS — K80.50 CALCULUS OF BILE DUCT WITHOUT CHOLANGITIS OR CHOLECYSTITIS WITHOUT OBSTRUCTION: ICD-10-CM

## 2020-02-12 DIAGNOSIS — M19.90 UNSPECIFIED OSTEOARTHRITIS, UNSPECIFIED SITE: ICD-10-CM

## 2020-02-12 DIAGNOSIS — K21.9 GASTRO-ESOPHAGEAL REFLUX DISEASE WITHOUT ESOPHAGITIS: ICD-10-CM

## 2020-02-19 PROBLEM — K80.50 CALCULUS OF BILE DUCT WITHOUT CHOLANGITIS OR CHOLECYSTITIS WITHOUT OBSTRUCTION: Chronic | Status: ACTIVE | Noted: 2020-02-04

## 2020-03-03 ENCOUNTER — APPOINTMENT (OUTPATIENT)
Dept: GASTROENTEROLOGY | Facility: HOSPITAL | Age: 85
End: 2020-03-03

## 2020-03-03 ENCOUNTER — OUTPATIENT (OUTPATIENT)
Dept: OUTPATIENT SERVICES | Facility: HOSPITAL | Age: 85
LOS: 1 days | Discharge: HOME | End: 2020-03-03
Payer: MEDICARE

## 2020-03-03 DIAGNOSIS — Z98.89 OTHER SPECIFIED POSTPROCEDURAL STATES: Chronic | ICD-10-CM

## 2020-03-03 DIAGNOSIS — Z90.89 ACQUIRED ABSENCE OF OTHER ORGANS: Chronic | ICD-10-CM

## 2020-03-03 DIAGNOSIS — Z96.642 PRESENCE OF LEFT ARTIFICIAL HIP JOINT: Chronic | ICD-10-CM

## 2020-03-03 PROCEDURE — 43275 ERCP REMOVE FORGN BODY DUCT: CPT

## 2020-03-03 PROCEDURE — 43262 ENDO CHOLANGIOPANCREATOGRAPH: CPT

## 2020-03-03 PROCEDURE — C1726: CPT

## 2020-03-03 PROCEDURE — 43275 ERCP REMOVE FORGN BODY DUCT: CPT | Mod: GC

## 2020-03-03 PROCEDURE — C1769: CPT

## 2020-03-03 PROCEDURE — 43264 ERCP REMOVE DUCT CALCULI: CPT

## 2020-03-03 PROCEDURE — C1889: CPT

## 2020-03-03 PROCEDURE — 74328 X-RAY BILE DUCT ENDOSCOPY: CPT | Mod: 26,GC

## 2020-03-03 PROCEDURE — 74330 X-RAY BILE/PANC ENDOSCOPY: CPT

## 2020-03-03 PROCEDURE — 43264 ERCP REMOVE DUCT CALCULI: CPT | Mod: GC,59

## 2020-03-03 PROCEDURE — 43262 ENDO CHOLANGIOPANCREATOGRAPH: CPT | Mod: GC,59

## 2020-04-02 ENCOUNTER — APPOINTMENT (OUTPATIENT)
Dept: GASTROENTEROLOGY | Facility: CLINIC | Age: 85
End: 2020-04-02
Payer: MEDICARE

## 2020-04-02 VITALS
TEMPERATURE: 97.4 F | BODY MASS INDEX: 23.7 KG/M2 | SYSTOLIC BLOOD PRESSURE: 166 MMHG | WEIGHT: 156.4 LBS | HEIGHT: 68 IN | DIASTOLIC BLOOD PRESSURE: 75 MMHG | OXYGEN SATURATION: 100 % | HEART RATE: 67 BPM

## 2020-04-02 DIAGNOSIS — K80.50 CALCULUS OF BILE DUCT W/OUT CHOLANGITIS OR CHOLECYSTITIS W/OUT OBSTRUCTION: ICD-10-CM

## 2020-04-02 PROCEDURE — 99213 OFFICE O/P EST LOW 20 MIN: CPT

## 2020-04-07 PROBLEM — K80.50 CHOLEDOCHOLITHIASIS: Status: ACTIVE | Noted: 2018-11-26

## 2020-04-07 NOTE — ASSESSMENT
[FreeTextEntry1] : Patient is stone free. He is feeling well and appears to have recovered fully without any sequelae\par No need for further follow up

## 2020-04-07 NOTE — HISTORY OF PRESENT ILLNESS
[FreeTextEntry1] : 84 s/p recent ERCP for CBD stones and post ERCP pancreatitis but also multiple prior episodes of gallstone pancreatitis\par He is doing well now. He denies RUQ pain or epigastric pain\par No back pain\par No fevers or chills\par No Jaundice, icterus or pruritus\par No change in stool, diarrhea or urgency\par Patient had Gb resected many years ago. He is now stone free and stent free

## 2020-09-19 NOTE — HISTORY OF PRESENT ILLNESS
[FreeTextEntry1] : 83 year male who notes planning to have back surgery at [facility]. He reports having clearance performed at [facility] including labs, CXR, EKG, Echo and bone density. He reports concern about weight loss and was told to get cancer screening appropriate. He is on Omeprazole for GERD but is concerned about risks of osteoporosis. He is followed by Dr Spencer of [facility] for his A. Fib. He has been near vegan for the past year but was vegetarian prior. He notes intermittent feelings of feeling tired and having beef based borsch and a pint of haagen daaz ice cream. He is on CPAP for sleep apnea. He started 2 days ago after being diagnosed at [facility]. He last had colonoscopy 2 years ago with Dr Brizuela at [facility]. 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 His last skin survey a long time ago. He has never smoked. He drinks only on occasion. He recalls being told to have an evaluation with ERCP for stones in his CBD but recalls having a second opinion with Dr Mo at which time he was told that no stones were present. He recalls in 1976, being told of abnormalities in his thyroid that were not present on second opinion. He reports a FH of prostate cancer. 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 He sees Urology, Dr Burk. His PSA has been 9-14 for decades and does not want to have a biopsy.  Yes unknown

## 2021-05-10 NOTE — ED ADULT NURSE NOTE - NS PRO PASSIVE SMOKE EXP
Comments: needle phobic
Length Of Therapy: 2 years
Detail Level: Zone
Add High Risk Medication Management Associated Diagnosis?: No
No

## 2023-07-28 ENCOUNTER — APPOINTMENT (OUTPATIENT)
Dept: ORTHOPEDIC SURGERY | Facility: CLINIC | Age: 88
End: 2023-07-28
Payer: MEDICARE

## 2023-07-28 PROCEDURE — 99213 OFFICE O/P EST LOW 20 MIN: CPT | Mod: 95

## 2023-08-09 NOTE — HISTORY OF PRESENT ILLNESS
[de-identified] : Pt is approximately 5 years s/p thoracolumbar spinal fusion.  He has no significant back pain.  He feels bent over and can't lay supine or prone. Unable to walk fast.  Walks about 1/2 mile to 1 mile.   Started PT 1 day ago.  He has bilateral hip surgery.

## 2023-08-09 NOTE — DISCUSSION/SUMMARY
[de-identified] : A lengthy discussion was had with the patient regarding his condition.   Continue with regular exercise and walking program. The patient's questions were sought and answered satisfactorily.   I, Roz Bob NP am acting as a scribe for Dr. Frank Schwab.

## 2024-08-01 ENCOUNTER — APPOINTMENT (OUTPATIENT)
Dept: ORTHOPEDIC SURGERY | Facility: CLINIC | Age: 89
End: 2024-08-01

## 2024-08-01 ENCOUNTER — APPOINTMENT (OUTPATIENT)
Dept: CT IMAGING | Facility: CLINIC | Age: 89
End: 2024-08-01
Payer: MEDICARE

## 2024-08-01 VITALS
BODY MASS INDEX: 23.64 KG/M2 | HEIGHT: 68 IN | OXYGEN SATURATION: 100 % | WEIGHT: 156 LBS | DIASTOLIC BLOOD PRESSURE: 66 MMHG | SYSTOLIC BLOOD PRESSURE: 129 MMHG | HEART RATE: 60 BPM

## 2024-08-01 DIAGNOSIS — Z98.1 ARTHRODESIS STATUS: ICD-10-CM

## 2024-08-01 PROCEDURE — 99213 OFFICE O/P EST LOW 20 MIN: CPT

## 2024-08-01 PROCEDURE — 72082 X-RAY EXAM ENTIRE SPI 2/3 VW: CPT

## 2024-08-01 PROCEDURE — 72131 CT LUMBAR SPINE W/O DYE: CPT

## 2024-08-09 NOTE — DISCUSSION/SUMMARY
[de-identified] :  A lengthy discussion was held with the patient regarding his condition. I am recommending a CT lumbar spine without contrast, order given. Follow up after imaging is completed for review. The patient's questions were sought and answered satisfactorily.

## 2024-08-09 NOTE — HISTORY OF PRESENT ILLNESS
[de-identified] : 8/1/24: Pt is approximately 6 years s/p thoracolumbar spinal fusion. He reports an exacerbation of low back pain over the past 3 weeks. Prior to 3 weeks ago, reports he chronic difficulty ambulating (same as preop). No pain radiating to lower extremities. S/p bilateral MARSHA with improvement in pain, but reports no improvement in ability to ambulate post op.

## 2024-08-09 NOTE — END OF VISIT
[FreeTextEntry3] :   This note was written by Cherie Jimenez PA-C, acting as a scribe for Dr. Frank Schwab. [Time Spent: ___ minutes] : I have spent [unfilled] minutes of time on the encounter.

## 2024-08-09 NOTE — PHYSICAL EXAM
[de-identified] : listing to the right, forward sagittal alignment no ttp lumbar paraspinal region no pain with lumbar extension [de-identified] : XR 8/1/24: italo fracture on left between L4/L5, remaining instrumentation intact

## 2024-08-12 ENCOUNTER — APPOINTMENT (OUTPATIENT)
Dept: PHYSICAL MEDICINE AND REHAB | Facility: CLINIC | Age: 89
End: 2024-08-12

## 2024-11-22 NOTE — ED ADULT NURSE NOTE - NSFALLRSKASSESSDT_ED_ALL_ED
The PA catheter was repositioned to the pulmonary wedge. Hemodynamics were performed. 04-Feb-2020 19:23

## 2025-02-05 ENCOUNTER — APPOINTMENT (OUTPATIENT)
Dept: PAIN MANAGEMENT | Facility: CLINIC | Age: 89
End: 2025-02-05